# Patient Record
Sex: MALE | Race: WHITE | NOT HISPANIC OR LATINO | URBAN - METROPOLITAN AREA
[De-identification: names, ages, dates, MRNs, and addresses within clinical notes are randomized per-mention and may not be internally consistent; named-entity substitution may affect disease eponyms.]

---

## 2018-05-04 ENCOUNTER — HOSPITAL ENCOUNTER (INPATIENT)
Facility: HOSPITAL | Age: 58
LOS: 3 days | Discharge: HOME | End: 2018-05-07
Attending: INTERNAL MEDICINE | Admitting: INTERNAL MEDICINE

## 2018-05-04 PROBLEM — I21.11 STEMI INVOLVING RIGHT CORONARY ARTERY (HCC): Status: ACTIVE | Noted: 2018-05-04

## 2018-05-04 PROBLEM — E78.5 DIET-CONTROLLED HYPERLIPIDEMIA: Status: ACTIVE | Noted: 2018-05-04

## 2018-05-04 PROBLEM — E11.9 DIABETES MELLITUS (HCC): Status: ACTIVE | Noted: 2018-05-04

## 2018-05-04 LAB
ALBUMIN SERPL-MCNC: 3.8 G/DL (ref 3.2–4.8)
ALBUMIN/GLOB SERPL: 1.5 G/DL (ref 1.5–2.5)
ALP SERPL-CCNC: 50 U/L (ref 25–100)
ALT SERPL W P-5'-P-CCNC: 43 U/L (ref 7–40)
ANION GAP SERPL CALCULATED.3IONS-SCNC: 6 MMOL/L (ref 3–11)
AST SERPL-CCNC: 94 U/L (ref 0–33)
BASOPHILS # BLD AUTO: 0.02 10*3/MM3 (ref 0–0.2)
BASOPHILS NFR BLD AUTO: 0.2 % (ref 0–1)
BILIRUB SERPL-MCNC: 0.5 MG/DL (ref 0.3–1.2)
BUN BLD-MCNC: 11 MG/DL (ref 9–23)
BUN/CREAT SERPL: 13.8 (ref 7–25)
CALCIUM SPEC-SCNC: 8.5 MG/DL (ref 8.7–10.4)
CHLORIDE SERPL-SCNC: 107 MMOL/L (ref 99–109)
CO2 SERPL-SCNC: 24 MMOL/L (ref 20–31)
CREAT BLD-MCNC: 0.8 MG/DL (ref 0.6–1.3)
DEPRECATED RDW RBC AUTO: 37.9 FL (ref 37–54)
EOSINOPHIL # BLD AUTO: 0.08 10*3/MM3 (ref 0–0.3)
EOSINOPHIL NFR BLD AUTO: 0.9 % (ref 0–3)
ERYTHROCYTE [DISTWIDTH] IN BLOOD BY AUTOMATED COUNT: 12.3 % (ref 11.3–14.5)
GFR SERPL CREATININE-BSD FRML MDRD: 99 ML/MIN/1.73
GLOBULIN UR ELPH-MCNC: 2.5 GM/DL
GLUCOSE BLD-MCNC: 120 MG/DL (ref 70–100)
GLUCOSE BLDC GLUCOMTR-MCNC: 109 MG/DL (ref 70–130)
GLUCOSE BLDC GLUCOMTR-MCNC: 112 MG/DL (ref 70–130)
HCT VFR BLD AUTO: 40.9 % (ref 38.9–50.9)
HGB BLD-MCNC: 13.8 G/DL (ref 13.1–17.5)
IMM GRANULOCYTES # BLD: 0.02 10*3/MM3 (ref 0–0.03)
IMM GRANULOCYTES NFR BLD: 0.2 % (ref 0–0.6)
LYMPHOCYTES # BLD AUTO: 1.5 10*3/MM3 (ref 0.6–4.8)
LYMPHOCYTES NFR BLD AUTO: 17.6 % (ref 24–44)
MAGNESIUM SERPL-MCNC: 2 MG/DL (ref 1.3–2.7)
MCH RBC QN AUTO: 28.5 PG (ref 27–31)
MCHC RBC AUTO-ENTMCNC: 33.7 G/DL (ref 32–36)
MCV RBC AUTO: 84.5 FL (ref 80–99)
MONOCYTES # BLD AUTO: 0.77 10*3/MM3 (ref 0–1)
MONOCYTES NFR BLD AUTO: 9 % (ref 0–12)
NEUTROPHILS # BLD AUTO: 6.17 10*3/MM3 (ref 1.5–8.3)
NEUTROPHILS NFR BLD AUTO: 72.3 % (ref 41–71)
PLATELET # BLD AUTO: 201 10*3/MM3 (ref 150–450)
PMV BLD AUTO: 10.4 FL (ref 6–12)
POTASSIUM BLD-SCNC: 3.9 MMOL/L (ref 3.5–5.5)
PROT SERPL-MCNC: 6.3 G/DL (ref 5.7–8.2)
RBC # BLD AUTO: 4.84 10*6/MM3 (ref 4.2–5.76)
SODIUM BLD-SCNC: 137 MMOL/L (ref 132–146)
TROPONIN I SERPL-MCNC: 30.91 NG/ML
WBC NRBC COR # BLD: 8.54 10*3/MM3 (ref 3.5–10.8)

## 2018-05-04 PROCEDURE — S0260 H&P FOR SURGERY: HCPCS | Performed by: INTERNAL MEDICINE

## 2018-05-04 PROCEDURE — C1760 CLOSURE DEV, VASC: HCPCS | Performed by: INTERNAL MEDICINE

## 2018-05-04 PROCEDURE — 80053 COMPREHEN METABOLIC PANEL: CPT | Performed by: INTERNAL MEDICINE

## 2018-05-04 PROCEDURE — 25010000002 BIVALIRUDIN 5 MG/ML: Performed by: INTERNAL MEDICINE

## 2018-05-04 PROCEDURE — B2111ZZ FLUOROSCOPY OF MULTIPLE CORONARY ARTERIES USING LOW OSMOLAR CONTRAST: ICD-10-PCS | Performed by: INTERNAL MEDICINE

## 2018-05-04 PROCEDURE — C1874 STENT, COATED/COV W/DEL SYS: HCPCS | Performed by: INTERNAL MEDICINE

## 2018-05-04 PROCEDURE — C1769 GUIDE WIRE: HCPCS | Performed by: INTERNAL MEDICINE

## 2018-05-04 PROCEDURE — C1887 CATHETER, GUIDING: HCPCS | Performed by: INTERNAL MEDICINE

## 2018-05-04 PROCEDURE — 85025 COMPLETE CBC W/AUTO DIFF WBC: CPT | Performed by: INTERNAL MEDICINE

## 2018-05-04 PROCEDURE — 93458 L HRT ARTERY/VENTRICLE ANGIO: CPT | Performed by: INTERNAL MEDICINE

## 2018-05-04 PROCEDURE — C1725 CATH, TRANSLUMIN NON-LASER: HCPCS | Performed by: INTERNAL MEDICINE

## 2018-05-04 PROCEDURE — 99223 1ST HOSP IP/OBS HIGH 75: CPT | Performed by: INTERNAL MEDICINE

## 2018-05-04 PROCEDURE — 83735 ASSAY OF MAGNESIUM: CPT | Performed by: INTERNAL MEDICINE

## 2018-05-04 PROCEDURE — 93010 ELECTROCARDIOGRAM REPORT: CPT | Performed by: INTERNAL MEDICINE

## 2018-05-04 PROCEDURE — 25010000002 MIDAZOLAM PER 1 MG: Performed by: INTERNAL MEDICINE

## 2018-05-04 PROCEDURE — 25010000002 EPTIFIBATIDE 20 MG/10ML SOLUTION: Performed by: INTERNAL MEDICINE

## 2018-05-04 PROCEDURE — 93005 ELECTROCARDIOGRAM TRACING: CPT | Performed by: INTERNAL MEDICINE

## 2018-05-04 PROCEDURE — 027034Z DILATION OF CORONARY ARTERY, ONE ARTERY WITH DRUG-ELUTING INTRALUMINAL DEVICE, PERCUTANEOUS APPROACH: ICD-10-PCS | Performed by: INTERNAL MEDICINE

## 2018-05-04 PROCEDURE — C9606 PERC D-E COR REVASC W AMI S: HCPCS | Performed by: INTERNAL MEDICINE

## 2018-05-04 PROCEDURE — 3E033PZ INTRODUCTION OF PLATELET INHIBITOR INTO PERIPHERAL VEIN, PERCUTANEOUS APPROACH: ICD-10-PCS | Performed by: INTERNAL MEDICINE

## 2018-05-04 PROCEDURE — C1894 INTRO/SHEATH, NON-LASER: HCPCS | Performed by: INTERNAL MEDICINE

## 2018-05-04 PROCEDURE — 25010000002 FENTANYL CITRATE (PF) 100 MCG/2ML SOLUTION: Performed by: INTERNAL MEDICINE

## 2018-05-04 PROCEDURE — 84484 ASSAY OF TROPONIN QUANT: CPT | Performed by: INTERNAL MEDICINE

## 2018-05-04 PROCEDURE — 92941 PRQ TRLML REVSC TOT OCCL AMI: CPT | Performed by: INTERNAL MEDICINE

## 2018-05-04 PROCEDURE — 4A023N7 MEASUREMENT OF CARDIAC SAMPLING AND PRESSURE, LEFT HEART, PERCUTANEOUS APPROACH: ICD-10-PCS | Performed by: INTERNAL MEDICINE

## 2018-05-04 PROCEDURE — 82962 GLUCOSE BLOOD TEST: CPT

## 2018-05-04 PROCEDURE — B2151ZZ FLUOROSCOPY OF LEFT HEART USING LOW OSMOLAR CONTRAST: ICD-10-PCS | Performed by: INTERNAL MEDICINE

## 2018-05-04 PROCEDURE — 0 IOPAMIDOL PER 1 ML: Performed by: INTERNAL MEDICINE

## 2018-05-04 DEVICE — XIENCE ALPINE EVEROLIMUS ELUTING CORONARY STENT SYSTEM 3.50 MM X 38 MM / RAPID-EXCHANGE
Type: IMPLANTABLE DEVICE | Status: FUNCTIONAL
Brand: XIENCE ALPINE

## 2018-05-04 RX ORDER — LISINOPRIL 5 MG/1
5 TABLET ORAL DAILY
Status: DISCONTINUED | OUTPATIENT
Start: 2018-05-04 | End: 2018-05-04

## 2018-05-04 RX ORDER — SODIUM PHOSPHATE,MONO-DIBASIC 19G-7G/118
1200 ENEMA (ML) RECTAL 2 TIMES DAILY WITH MEALS
COMMUNITY

## 2018-05-04 RX ORDER — UBIDECARENONE 100 MG
100 CAPSULE ORAL DAILY
COMMUNITY

## 2018-05-04 RX ORDER — HYDROCODONE BITARTRATE AND ACETAMINOPHEN 5; 325 MG/1; MG/1
1 TABLET ORAL EVERY 4 HOURS PRN
Status: DISCONTINUED | OUTPATIENT
Start: 2018-05-04 | End: 2018-05-07 | Stop reason: HOSPADM

## 2018-05-04 RX ORDER — NICOTINE POLACRILEX 4 MG
15 LOZENGE BUCCAL
Status: DISCONTINUED | OUTPATIENT
Start: 2018-05-04 | End: 2018-05-04

## 2018-05-04 RX ORDER — FENTANYL CITRATE 50 UG/ML
INJECTION, SOLUTION INTRAMUSCULAR; INTRAVENOUS AS NEEDED
Status: DISCONTINUED | OUTPATIENT
Start: 2018-05-04 | End: 2018-05-04 | Stop reason: HOSPADM

## 2018-05-04 RX ORDER — MIDAZOLAM HYDROCHLORIDE 1 MG/ML
INJECTION INTRAMUSCULAR; INTRAVENOUS AS NEEDED
Status: DISCONTINUED | OUTPATIENT
Start: 2018-05-04 | End: 2018-05-04 | Stop reason: HOSPADM

## 2018-05-04 RX ORDER — HEPARIN SODIUM 5000 [USP'U]/ML
5000 INJECTION, SOLUTION INTRAVENOUS; SUBCUTANEOUS EVERY 12 HOURS SCHEDULED
Status: DISCONTINUED | OUTPATIENT
Start: 2018-05-05 | End: 2018-05-07 | Stop reason: HOSPADM

## 2018-05-04 RX ORDER — ASPIRIN 81 MG/1
81 TABLET, CHEWABLE ORAL DAILY
Status: DISCONTINUED | OUTPATIENT
Start: 2018-05-04 | End: 2018-05-07 | Stop reason: HOSPADM

## 2018-05-04 RX ORDER — DIPHENHYDRAMINE HYDROCHLORIDE 50 MG/ML
25 INJECTION INTRAMUSCULAR; INTRAVENOUS EVERY 6 HOURS PRN
Status: DISCONTINUED | OUTPATIENT
Start: 2018-05-04 | End: 2018-05-07 | Stop reason: HOSPADM

## 2018-05-04 RX ORDER — ACETAMINOPHEN 325 MG/1
650 TABLET ORAL EVERY 4 HOURS PRN
Status: DISCONTINUED | OUTPATIENT
Start: 2018-05-04 | End: 2018-05-04

## 2018-05-04 RX ORDER — ASPIRIN 325 MG
325 TABLET ORAL DAILY
Status: DISCONTINUED | OUTPATIENT
Start: 2018-05-05 | End: 2018-05-04

## 2018-05-04 RX ORDER — CLOPIDOGREL BISULFATE 75 MG/1
75 TABLET ORAL DAILY
Status: DISCONTINUED | OUTPATIENT
Start: 2018-05-05 | End: 2018-05-04

## 2018-05-04 RX ORDER — ATORVASTATIN CALCIUM 40 MG/1
80 TABLET, FILM COATED ORAL NIGHTLY
Status: DISCONTINUED | OUTPATIENT
Start: 2018-05-04 | End: 2018-05-07 | Stop reason: HOSPADM

## 2018-05-04 RX ORDER — EPTIFIBATIDE 2 MG/ML
INJECTION, SOLUTION INTRAVENOUS AS NEEDED
Status: DISCONTINUED | OUTPATIENT
Start: 2018-05-04 | End: 2018-05-04 | Stop reason: HOSPADM

## 2018-05-04 RX ORDER — OMEPRAZOLE 20 MG/1
20 CAPSULE, DELAYED RELEASE ORAL DAILY
COMMUNITY

## 2018-05-04 RX ORDER — SODIUM CHLORIDE 0.9 % (FLUSH) 0.9 %
1-10 SYRINGE (ML) INJECTION AS NEEDED
Status: DISCONTINUED | OUTPATIENT
Start: 2018-05-04 | End: 2018-05-07 | Stop reason: HOSPADM

## 2018-05-04 RX ORDER — AMPICILLIN TRIHYDRATE 250 MG
1200 CAPSULE ORAL 2 TIMES DAILY
COMMUNITY
End: 2018-05-07 | Stop reason: HOSPADM

## 2018-05-04 RX ORDER — CALCIUM CARBONATE 200(500)MG
2 TABLET,CHEWABLE ORAL 2 TIMES DAILY PRN
Status: DISCONTINUED | OUTPATIENT
Start: 2018-05-04 | End: 2018-05-07 | Stop reason: HOSPADM

## 2018-05-04 RX ORDER — TADALAFIL 5 MG/1
5 TABLET ORAL DAILY PRN
COMMUNITY
End: 2018-05-07 | Stop reason: HOSPADM

## 2018-05-04 RX ORDER — NEBIVOLOL 5 MG/1
5 TABLET ORAL
Status: DISCONTINUED | OUTPATIENT
Start: 2018-05-04 | End: 2018-05-07 | Stop reason: HOSPADM

## 2018-05-04 RX ORDER — SODIUM CHLORIDE 9 MG/ML
INJECTION, SOLUTION INTRAVENOUS CONTINUOUS PRN
Status: DISCONTINUED | OUTPATIENT
Start: 2018-05-04 | End: 2018-05-04 | Stop reason: HOSPADM

## 2018-05-04 RX ORDER — DEXTROSE MONOHYDRATE 25 G/50ML
25 INJECTION, SOLUTION INTRAVENOUS
Status: DISCONTINUED | OUTPATIENT
Start: 2018-05-04 | End: 2018-05-04

## 2018-05-04 RX ORDER — ATROPINE SULFATE 1 MG/ML
INJECTION, SOLUTION INTRAMUSCULAR; INTRAVENOUS; SUBCUTANEOUS AS NEEDED
Status: DISCONTINUED | OUTPATIENT
Start: 2018-05-04 | End: 2018-05-04 | Stop reason: HOSPADM

## 2018-05-04 RX ORDER — DOCUSATE SODIUM 100 MG/1
100 CAPSULE, LIQUID FILLED ORAL DAILY
Status: DISCONTINUED | OUTPATIENT
Start: 2018-05-04 | End: 2018-05-07 | Stop reason: HOSPADM

## 2018-05-04 RX ORDER — LIDOCAINE HYDROCHLORIDE 10 MG/ML
INJECTION, SOLUTION EPIDURAL; INFILTRATION; INTRACAUDAL; PERINEURAL AS NEEDED
Status: DISCONTINUED | OUTPATIENT
Start: 2018-05-04 | End: 2018-05-04 | Stop reason: HOSPADM

## 2018-05-04 RX ORDER — BISACODYL 5 MG/1
5 TABLET, DELAYED RELEASE ORAL DAILY PRN
Status: DISCONTINUED | OUTPATIENT
Start: 2018-05-04 | End: 2018-05-07 | Stop reason: HOSPADM

## 2018-05-04 RX ORDER — NICARDIPINE HYDROCHLORIDE 2.5 MG/ML
INJECTION INTRAVENOUS AS NEEDED
Status: DISCONTINUED | OUTPATIENT
Start: 2018-05-04 | End: 2018-05-04 | Stop reason: HOSPADM

## 2018-05-04 RX ORDER — ASPIRIN 81 MG/1
81 TABLET, CHEWABLE ORAL DAILY
COMMUNITY

## 2018-05-04 RX ADMIN — ASPIRIN 81 MG 81 MG: 81 TABLET ORAL at 17:12

## 2018-05-04 RX ADMIN — ATORVASTATIN CALCIUM 80 MG: 40 TABLET, FILM COATED ORAL at 20:17

## 2018-05-04 RX ADMIN — NEBIVOLOL HYDROCHLORIDE 5 MG: 5 TABLET ORAL at 17:12

## 2018-05-04 NOTE — PROGRESS NOTES
INTENSIVIST   PROGRESS NOTE     Hospital:  LOS: 0 days   Subjective   S     Mr. Nicholas Ventura, 58 y.o. male is followed for:      STEMI involving right coronary artery    Diabetes mellitus    As an Intensivist, we provide an integrated approach to the ICU patient and family, medical management of comorbid conditions, lead interdisciplinary rounds and coordinate the care with all other services, including those from other specialists.     Interval History:    Day of Surgery [facility]     Nicholas Ventura is a 58 y.o. male presented to [facility] today for chest pressure/burning from an OSH ([facility]).      He has been having this burning sensation intermittently for the last  couple months.  He saw his PCP (SALVATORE) last week and had an EKG and 24 hour heart monitor.  He was to follow-up on Monday.      He is a  and started having this pressure and went to an OSH for treatment.  He was found to have an inferior STEMI and transferred to [facility] for treament.    He was taken emergently to the Cath Lab upon arrival and was found to have an occluded RCA.  He received a DINA stent to the RCA.  His LVEF was 45-50%.     He has arrived to the ICU from the cath lab.  He has no chest pain.  He has a right groin puncture site that is oozing.  He is hemodynamically stable.     The patient's relevant past medical, surgical and social history were reviewed and updated in Epic as appropriate.     PMH: He  has a past medical history of Diabetes mellitus and Hyperlipidemia.   PSxH: He  has no past surgical history on file.      Medications:  No current facility-administered medications on file prior to encounter.      No current outpatient prescriptions on file prior to encounter.       Allergies: He has No Known Allergies.   FH: His family history includes Cancer in his father and mother; Transient ischemic attack in his father.   SH: He  reports that he has never smoked. He has never used smokeless tobacco. He reports that he does  not drink alcohol or use drugs.     ROS:   Constitutional: Negative for fever.   Respiratory: Negative for dyspnea.   Cardiovascular: Negative for chest pain.   Gastrointestinal: Negative for  nausea, vomiting and diarrhea.   All other systems reviewed and are negative.    Objective   O     Vitals:  Temp: 98 °F (36.7 °C) (05/04/18 1700) Temp  Min: 98 °F (36.7 °C)  Max: 98 °F (36.7 °C)   BP: 128/88 (05/04/18 1700) BP  Min: 117/76  Max: 153/82   Pulse: 95 (05/04/18 1700) Pulse  Min: 74  Max: 99   Resp: 16 (05/04/18 1639) Resp  Min: 16  Max: 16   SpO2: 98 % (05/04/18 1700) SpO2  Min: 97 %  Max: 98 %   Device: room air (05/04/18 1700)    Flow Rate:   No Data Recorded     Physical Examination  Telemetry:  Rhythm: normal sinus rhythm (05/04/18 1700)         Constitutional:  No acute distress.   Cardiovascular: Normal rate, regular rhythm. Normal heart sounds.  No murmurs, gallop or rub.   Respiratory: No respiratory distress. Normal respiratory effort.  Normal breath sounds  Clear to auscultation and percussion.    Abdominal:  Soft. No masses. Non-tender. No distension. No HSM.   Extremities: No digital cyanosis. No clubbing.  No peripheral edema.   Neurological:   Alert and Oriented to person, place, and time.     Lines/Drains/Airways: Peripheral IV(s)     Hematology:    Results from last 7 days  Lab Units 05/04/18  1706   WBC 10*3/mm3 8.54   HEMOGLOBIN g/dL 13.8   MCV fL 84.5   PLATELETS 10*3/mm3 201     Chemistry:    Results from last 7 days  Lab Units 05/04/18  1706   SODIUM mmol/L 137   POTASSIUM mmol/L 3.9   CHLORIDE mmol/L 107   CO2 mmol/L 24.0   ANION GAP mmol/L 6.0   GLUCOSE mg/dL 120*   CALCIUM mg/dL 8.5*       Results from last 7 days  Lab Units 05/04/18  1706   BUN mg/dL 11   CREATININE mg/dL 0.80       Results from last 7 days  Lab Units 05/04/18  1706   MAGNESIUM mg/dL 2.0     Estimated Creatinine Clearance: 113.5 mL/min (by C-G formula based on SCr of 0.8 mg/dL).    Hepatic:    Results from last 7 days  Lab  Units 05/04/18  1706   ALK PHOS U/L 50   BILIRUBIN mg/dL 0.5   ALT (SGPT) U/L 43*   AST (SGOT) U/L 94*   ALBUMIN g/dL 3.80     Images:  No radiology results for the last day    Results: Reviewed.  I reviewed the patient's new laboratory and imaging results.  I independently reviewed the patient's new images.    Medications: Reviewed.    Assessment/Plan   A / P     58 y.o.male, admitted on 5/4/2018 with STEMI involving right coronary artery [I21.11]:     1. Inferior STEMI  1. LHC (5/4/2018) Xience (Everolimus) EES to RCA  2. EF 45-50%  3. Inferior wall, severe HK  2. Dyslipidemia  3. Antibiotics: none  4. Overweight. Body mass index is 29.18 kg/m².   5. T2DM (diet controlled)    Results from last 7 days  Lab Units 05/04/18  1721   GLUCOSE mg/dL 109     No results found for: HGBA1C    Nutrition Support: Patient isn't on Tube Feeding   Modulars: Patient doesn't have any tube feeding modular orders   Diet: Diet Regular; Consistent Carbohydrate, Cardiac No active supplement orders     Advance Directives: Full Code       Assessment / Plan:    1. ICU admission post C  2. Glucose < 180  3. HbA1c in AM  Low cholesterol <200 mg.dl and low saturated fat <7% diet  LDL goal < 70 mg/dl  BP < 130/80 if DM or CKD  HbA1c controlled  Exercise at least 30 minutes 3-4 times per week  BMI 18.5-24.9  Annual influenza vaccine  4. ASA 81 mg/day  5. P2Y12 inhibitor for 12 months  6. b-Blocker  7. High dose statins  8. ACEI or ARB  9. Nitrates prn symptoms    Plan of care and goals reviewed during interdisciplinary rounds.  Level of Risk is High due to:  illness with threat to life or bodily function.   I discussed the patient's findings and my recommendations with patient    Adams John MD, FACP, FCCP, CNSC  Intensive Care Medicine, Nutrition Support and Pulmonary Medicine

## 2018-05-04 NOTE — H&P
[facility] at [facility]   History and physical    Patient Care Team:  No Known Provider as PCP - General     Problem List:  1. STEMI  2. Diet controlled diabetes  3. GERD  4. Surgeries:  1. None       Allergies no known allergies        Current Facility-Administered Medications:   •  bivalirudin (ANGIOMAX) bolus from bag, , , PRN, Louis Olivia MD, 67.05 mg at 05/04/18 1602  •  fentaNYL citrate (PF) (SUBLIMAZE) injection, , , PRN, Louis Olivia MD, 50 mcg at 05/04/18 1558  •  lidocaine PF 1% (XYLOCAINE) injection, , , PRN, Louis Olivia MD, 10 mL at 05/04/18 1559  •  midazolam (VERSED) injection, , , PRN, Louis Olivia MD, 2 mg at 05/04/18 1558         No prescriptions prior to admission.     Home medications  Prilosec  Cialis PRN    Subjective .   History of present illness:    Patient presents today for emergent cardiac catheterization due to inferior STEMI. No previous history of CAD. Has had some recurrent chest pain over the last couple of weeks. Today he had sudden onset of severe left sided chest burning sensation that did not go away. After this persisted for a few hours he presented to the closest ER for further evaluation. There he was diagnosed with an inferior STEMI and transferred for emergent cath. On arrival complains of a burning sensation in his chest with still persistent ST elevation.    Patient is a  who resides in [place].    Social History     Social History   • Marital status: Unknown     Spouse name: N/A   • Number of children: N/A   • Years of education: N/A     Occupational History   • Not on file.     Social History Main Topics   • Smoking status: Never Smoker   • Smokeless tobacco: Not on file   • Alcohol use No   • Drug use: No   • Sexual activity: Not on file     Other Topics Concern   • Not on file     Social History Narrative   • No narrative on file     Family History   Problem Relation Age of Onset   • Transient ischemic attack Father   "        Review of Systems:  Review of Systems   Constitution: Negative for fever, weakness and malaise/fatigue.   HENT: Negative for hoarse voice, nosebleeds and tinnitus.    Eyes: Negative for pain and visual disturbance.   Cardiovascular: Positive for chest pain. Negative for claudication, cyanosis, dyspnea on exertion, irregular heartbeat, leg swelling, near-syncope, orthopnea, palpitations, paroxysmal nocturnal dyspnea and syncope.   Respiratory: Negative for cough, hemoptysis, shortness of breath, sleep disturbances due to breathing, snoring, sputum production and wheezing.    Endocrine: Negative for polydipsia and polyuria.   Hematologic/Lymphatic: Negative for bleeding problem. Does not bruise/bleed easily.   Skin: Negative for itching, rash and skin cancer.   Musculoskeletal: Negative for arthritis, back pain, joint pain, joint swelling and myalgias.   Gastrointestinal: Positive for heartburn. Negative for abdominal pain, anorexia, constipation, diarrhea, hematemesis, hematochezia, melena, nausea and vomiting.   Genitourinary: Negative for bladder incontinence, dysuria and hematuria.   Neurological: Negative for disturbances in coordination, dizziness, focal weakness, headaches, light-headedness, loss of balance, numbness, seizures and vertigo.   Psychiatric/Behavioral: Negative for altered mental status, depression and memory loss. The patient is not nervous/anxious.    Allergic/Immunologic: Negative for hives and persistent infections.        Objective   Vitals:  Blood pressure 153/82, pulse 74, resp. rate 16, height 177.8 cm (70\"), weight 89.4 kg (197 lb), SpO2 98 %.     Physical Exam   Constitutional: He is oriented to person, place, and time. He appears well-developed and well-nourished. No distress.   HENT:   Head: Normocephalic and atraumatic.   Mouth/Throat: Oropharynx is clear and moist.   Eyes: Right eye exhibits no discharge. Left eye exhibits no discharge.   Neck: No JVD present. Carotid bruit is " not present.   No bruit auscultated bilaterally   Cardiovascular: Normal rate, regular rhythm, S1 normal, S2 normal, normal heart sounds and intact distal pulses.  Exam reveals no gallop, no S3 and no S4.    No murmur heard.  Pulses:       Carotid pulses are 2+ on the right side, and 2+ on the left side.       Radial pulses are 2+ on the right side, and 2+ on the left side.   Pulmonary/Chest: Effort normal and breath sounds normal. No respiratory distress.   Abdominal: Soft. Bowel sounds are normal. There is no tenderness.   Musculoskeletal: He exhibits no edema.   Neurological: He is alert and oriented to person, place, and time.   Skin: Skin is warm and dry. No rash noted.            Results Review:  No labs sent from outside facility with patient. We will check them here.      Tele:  SR    EKG: SR with inferior elevation and septal depression      Assessment/Plan     1. Inferior STEMI  2. Diabetes       Plan:    1. Emergent left heart cath with possible intervention. Further recommendations to follow cath. Upon discharge will need to schedule follow-up with a cardiologist in [place] where he is from.    Louis Olivia MD  05/04/18  4:04 PM    Dictated utilizing Dragon dictation

## 2018-05-04 NOTE — PLAN OF CARE
Problem: Patient Care Overview  Goal: Plan of Care Review  Outcome: Ongoing (interventions implemented as appropriate)   05/04/18 3552   Coping/Psychosocial   Plan of Care Reviewed With patient   Plan of Care Review   Progress improving   OTHER   Outcome Summary Admitted to 2A VSS, additional manual pressure needed for 30min at s/p sheath site. Hemostsis acheived.        Problem: Cardiac: ACS (Acute Coronary Syndrome) (Adult)  Goal: Signs and Symptoms of Listed Potential Problems Will be Absent, Minimized or Managed (Cardiac: ACS)  Outcome: Ongoing (interventions implemented as appropriate)

## 2018-05-05 ENCOUNTER — APPOINTMENT (OUTPATIENT)
Dept: CARDIOLOGY | Facility: HOSPITAL | Age: 58
End: 2018-05-05
Attending: INTERNAL MEDICINE

## 2018-05-05 LAB
ANION GAP SERPL CALCULATED.3IONS-SCNC: 4 MMOL/L (ref 3–11)
ARTICHOKE IGE QN: 115 MG/DL (ref 0–130)
BH CV ECHO MEAS - AO ROOT AREA (BSA CORRECTED): 1.5
BH CV ECHO MEAS - AO ROOT AREA: 7.9 CM^2
BH CV ECHO MEAS - AO ROOT DIAM: 3.2 CM
BH CV ECHO MEAS - BSA(HAYCOCK): 2.2 M^2
BH CV ECHO MEAS - BSA: 2.1 M^2
BH CV ECHO MEAS - BZI_BMI: 29.1 KILOGRAMS/M^2
BH CV ECHO MEAS - BZI_METRIC_HEIGHT: 177.8 CM
BH CV ECHO MEAS - BZI_METRIC_WEIGHT: 92.1 KG
BH CV ECHO MEAS - CONTRAST EF (2CH): 64.4 ML/M^2
BH CV ECHO MEAS - CONTRAST EF 4CH: 43.3 ML/M^2
BH CV ECHO MEAS - EDV(CUBED): 99.3 ML
BH CV ECHO MEAS - EDV(MOD-SP2): 45 ML
BH CV ECHO MEAS - EDV(MOD-SP4): 67 ML
BH CV ECHO MEAS - EDV(TEICH): 98.9 ML
BH CV ECHO MEAS - EF(CUBED): 75.8 %
BH CV ECHO MEAS - EF(MOD-BP): 52 %
BH CV ECHO MEAS - EF(MOD-SP2): 64.4 %
BH CV ECHO MEAS - EF(MOD-SP4): 43.3 %
BH CV ECHO MEAS - EF(TEICH): 67.8 %
BH CV ECHO MEAS - ESV(CUBED): 24 ML
BH CV ECHO MEAS - ESV(MOD-SP2): 16 ML
BH CV ECHO MEAS - ESV(MOD-SP4): 38 ML
BH CV ECHO MEAS - ESV(TEICH): 31.8 ML
BH CV ECHO MEAS - FS: 37.7 %
BH CV ECHO MEAS - IVS/LVPW: 0.91
BH CV ECHO MEAS - IVSD: 1 CM
BH CV ECHO MEAS - LA DIMENSION: 3.8 CM
BH CV ECHO MEAS - LA/AO: 1.2
BH CV ECHO MEAS - LV DIASTOLIC VOL/BSA (35-75): 31.9 ML/M^2
BH CV ECHO MEAS - LV MASS(C)D: 181.3 GRAMS
BH CV ECHO MEAS - LV MASS(C)DI: 86.3 GRAMS/M^2
BH CV ECHO MEAS - LV SYSTOLIC VOL/BSA (12-30): 18.1 ML/M^2
BH CV ECHO MEAS - LVIDD: 4.6 CM
BH CV ECHO MEAS - LVIDS: 2.9 CM
BH CV ECHO MEAS - LVLD AP2: 7.1 CM
BH CV ECHO MEAS - LVLD AP4: 7.6 CM
BH CV ECHO MEAS - LVLS AP2: 5.5 CM
BH CV ECHO MEAS - LVLS AP4: 6.5 CM
BH CV ECHO MEAS - LVPWD: 1.1 CM
BH CV ECHO MEAS - MED PEAK E' VEL: 7.3 CM/SEC
BH CV ECHO MEAS - MV A DUR: 6.6 SEC
BH CV ECHO MEAS - MV A MAX VEL: 46.9 CM/SEC
BH CV ECHO MEAS - MV E MAX VEL: 57.8 CM/SEC
BH CV ECHO MEAS - MV E/A: 1.2
BH CV ECHO MEAS - PA ACC SLOPE: 249.2 CM/SEC^2
BH CV ECHO MEAS - PA ACC TIME: 0.16 SEC
BH CV ECHO MEAS - PA PR(ACCEL): 7.7 MMHG
BH CV ECHO MEAS - SI(CUBED): 35.9 ML/M^2
BH CV ECHO MEAS - SI(MOD-SP2): 13.8 ML/M^2
BH CV ECHO MEAS - SI(MOD-SP4): 13.8 ML/M^2
BH CV ECHO MEAS - SI(TEICH): 31.9 ML/M^2
BH CV ECHO MEAS - SV(CUBED): 75.3 ML
BH CV ECHO MEAS - SV(MOD-SP2): 29 ML
BH CV ECHO MEAS - SV(MOD-SP4): 29 ML
BH CV ECHO MEAS - SV(TEICH): 67.1 ML
BH CV ECHO MEAS - TAPSE (>1.6): 1.4 CM2
BH CV ECHO MEAS - TR MAX VEL: 167 CM/SEC
BH CV VAS BP RIGHT ARM: NORMAL MMHG
BH CV XLRA - RV BASE: 4 CM
BH CV XLRA - RV LENGTH: 8.7 CM
BH CV XLRA - RV MID: 3.2 CM
BH CV XLRA - TDI S': 3.2 CM/SEC
BUN BLD-MCNC: 11 MG/DL (ref 9–23)
BUN/CREAT SERPL: 12.2 (ref 7–25)
CALCIUM SPEC-SCNC: 9.2 MG/DL (ref 8.7–10.4)
CHLORIDE SERPL-SCNC: 105 MMOL/L (ref 99–109)
CHOLEST SERPL-MCNC: 163 MG/DL (ref 0–200)
CO2 SERPL-SCNC: 29 MMOL/L (ref 20–31)
CREAT BLD-MCNC: 0.9 MG/DL (ref 0.6–1.3)
DEPRECATED RDW RBC AUTO: 38.9 FL (ref 37–54)
ERYTHROCYTE [DISTWIDTH] IN BLOOD BY AUTOMATED COUNT: 12.6 % (ref 11.3–14.5)
GFR SERPL CREATININE-BSD FRML MDRD: 86 ML/MIN/1.73
GLUCOSE BLD-MCNC: 173 MG/DL (ref 70–100)
GLUCOSE BLDC GLUCOMTR-MCNC: 130 MG/DL (ref 70–130)
GLUCOSE BLDC GLUCOMTR-MCNC: 130 MG/DL (ref 70–130)
GLUCOSE BLDC GLUCOMTR-MCNC: 136 MG/DL (ref 70–130)
GLUCOSE BLDC GLUCOMTR-MCNC: 159 MG/DL (ref 70–130)
HBA1C MFR BLD: 6.9 % (ref 4.8–5.6)
HCT VFR BLD AUTO: 42.7 % (ref 38.9–50.9)
HDLC SERPL-MCNC: 38 MG/DL (ref 40–60)
HGB BLD-MCNC: 14.5 G/DL (ref 13.1–17.5)
LV EF 2D ECHO EST: 50 %
MAXIMAL PREDICTED HEART RATE: 161 BPM
MCH RBC QN AUTO: 28.9 PG (ref 27–31)
MCHC RBC AUTO-ENTMCNC: 34 G/DL (ref 32–36)
MCV RBC AUTO: 85.2 FL (ref 80–99)
PLATELET # BLD AUTO: 222 10*3/MM3 (ref 150–450)
PMV BLD AUTO: 10.5 FL (ref 6–12)
POTASSIUM BLD-SCNC: 3.9 MMOL/L (ref 3.5–5.5)
RBC # BLD AUTO: 5.01 10*6/MM3 (ref 4.2–5.76)
SODIUM BLD-SCNC: 138 MMOL/L (ref 132–146)
STRESS TARGET HR: 137 BPM
TRIGL SERPL-MCNC: 154 MG/DL (ref 0–150)
TROPONIN I SERPL-MCNC: 124.84 NG/ML
TSH SERPL DL<=0.05 MIU/L-ACNC: 2.35 MIU/ML (ref 0.35–5.35)
WBC NRBC COR # BLD: 9.93 10*3/MM3 (ref 3.5–10.8)

## 2018-05-05 PROCEDURE — 93306 TTE W/DOPPLER COMPLETE: CPT

## 2018-05-05 PROCEDURE — 99232 SBSQ HOSP IP/OBS MODERATE 35: CPT | Performed by: INTERNAL MEDICINE

## 2018-05-05 PROCEDURE — 85027 COMPLETE CBC AUTOMATED: CPT | Performed by: INTERNAL MEDICINE

## 2018-05-05 PROCEDURE — 25010000002 HEPARIN (PORCINE) PER 1000 UNITS: Performed by: INTERNAL MEDICINE

## 2018-05-05 PROCEDURE — 83036 HEMOGLOBIN GLYCOSYLATED A1C: CPT | Performed by: INTERNAL MEDICINE

## 2018-05-05 PROCEDURE — 80048 BASIC METABOLIC PNL TOTAL CA: CPT | Performed by: INTERNAL MEDICINE

## 2018-05-05 PROCEDURE — 63710000001 INSULIN LISPRO (HUMAN) PER 5 UNITS: Performed by: INTERNAL MEDICINE

## 2018-05-05 PROCEDURE — 99233 SBSQ HOSP IP/OBS HIGH 50: CPT | Performed by: INTERNAL MEDICINE

## 2018-05-05 PROCEDURE — 84484 ASSAY OF TROPONIN QUANT: CPT | Performed by: INTERNAL MEDICINE

## 2018-05-05 PROCEDURE — 80061 LIPID PANEL: CPT | Performed by: INTERNAL MEDICINE

## 2018-05-05 PROCEDURE — 93306 TTE W/DOPPLER COMPLETE: CPT | Performed by: INTERNAL MEDICINE

## 2018-05-05 PROCEDURE — 82962 GLUCOSE BLOOD TEST: CPT

## 2018-05-05 PROCEDURE — 84443 ASSAY THYROID STIM HORMONE: CPT | Performed by: INTERNAL MEDICINE

## 2018-05-05 RX ADMIN — HEPARIN SODIUM 5000 UNITS: 5000 INJECTION, SOLUTION INTRAVENOUS; SUBCUTANEOUS at 08:31

## 2018-05-05 RX ADMIN — TICAGRELOR 90 MG: 90 TABLET ORAL at 08:31

## 2018-05-05 RX ADMIN — HEPARIN SODIUM 5000 UNITS: 5000 INJECTION, SOLUTION INTRAVENOUS; SUBCUTANEOUS at 21:11

## 2018-05-05 RX ADMIN — TICAGRELOR 90 MG: 90 TABLET ORAL at 21:11

## 2018-05-05 RX ADMIN — ATORVASTATIN CALCIUM 80 MG: 40 TABLET, FILM COATED ORAL at 21:11

## 2018-05-05 RX ADMIN — ASPIRIN 81 MG 81 MG: 81 TABLET ORAL at 08:31

## 2018-05-05 RX ADMIN — NEBIVOLOL HYDROCHLORIDE 5 MG: 5 TABLET ORAL at 08:31

## 2018-05-05 NOTE — PROGRESS NOTES
Intensive Care Follow-up      LOS: 1 day     Mr. Nicholas Ventura, 58 y.o. male is followed for: STEMI involving right coronary artery     Subjective - Interval History     Patient is awake and alert and has no further chest pain.  He did complain of some right groin pain earlier.    The patient's relevant past medical, surgical and social history were reviewed and updated in Epic as appropriate.     Objective     Infusions:     Medications:    aspirin 81 mg Oral Daily   atorvastatin 80 mg Oral Nightly   docusate sodium 100 mg Oral Daily   heparin (porcine) 5,000 Units Subcutaneous Q12H   insulin lispro 0-7 Units Subcutaneous 4x Daily With Meals & Nightly   nebivolol 5 mg Oral Q24H   pharmacy consult - MTM  Does not apply Daily   ticagrelor 90 mg Oral BID       Vital Sign Min/Max for last 24 hours  Temp  Min: 98 °F (36.7 °C)  Max: 98.6 °F (37 °C)   BP  Min: 101/88  Max: 153/82   Pulse  Min: 64  Max: 99   Resp  Min: 14  Max: 18   SpO2  Min: 94 %  Max: 98 %   No Data Recorded      Intake/Output Summary (Last 24 hours) at 05/05/18 1446  Last data filed at 05/05/18 1400   Gross per 24 hour   Intake              720 ml   Output             2855 ml   Net            -2135 ml           Physical Exam:   GENERAL: Awake and alert   HEENT: Normocephalic   LUNGS: Clear   HEART: Normal S1 and S2   ABDOMEN: Soft   EXTREMITIES: No edema   NEURO/PSYCH: No focal deficits      Results from last 7 days  Lab Units 05/05/18  0422 05/04/18  1706   WBC 10*3/mm3 9.93 8.54   HEMOGLOBIN g/dL 14.5 13.8   PLATELETS 10*3/mm3 222 201       Results from last 7 days  Lab Units 05/05/18  0422 05/04/18  1706   SODIUM mmol/L 138 137   POTASSIUM mmol/L 3.9 3.9   CO2 mmol/L 29.0 24.0   BUN mg/dL 11 11   CREATININE mg/dL 0.90 0.80   MAGNESIUM mg/dL  --  2.0   GLUCOSE mg/dL 173* 120*     Estimated Creatinine Clearance: 100.9 mL/min (by C-G formula based on SCr of 0.9 mg/dL).      Results from last 7 days  Lab Units 05/05/18  0422   HEMOGLOBIN A1C % 6.90*            No results found for: LACTATE       Images: No new imaging    I reviewed the patient's results and images.     Impression      Hospital Problem List     * (Principal)STEMI involving right coronary artery          Diabetes mellitus          Diet-controlled hyperlipidemia                     Plan        Patient seems be doing well following his MI.  He's preceding per protocol.  He is awaiting transfer to the floor.  We'll continue supportive measures.     Plan of care and goals reviewed with nurse at daily rounds.   I discussed the patient's findings and my recommendations with patient, family and nursing staff       Aldo Escobar MD  Pulmonary and Critical Care Medicine  05/05/18 2:46 PM

## 2018-05-05 NOTE — PLAN OF CARE
Problem: Patient Care Overview  Goal: Plan of Care Review  Outcome: Ongoing (interventions implemented as appropriate)   05/05/18 0606   Coping/Psychosocial   Plan of Care Reviewed With patient   Plan of Care Review   Progress improving   OTHER   Outcome Summary VSS, no further bleeding or hematoma noted, minimal pain, adequate UOP.       Problem: Cardiac: ACS (Acute Coronary Syndrome) (Adult)  Goal: Signs and Symptoms of Listed Potential Problems Will be Absent, Minimized or Managed (Cardiac: ACS)  Outcome: Ongoing (interventions implemented as appropriate)   05/05/18 0606   Goal/Outcome Evaluation   Problems Assessed (Acute Coronary Syndrome) all   Problems Present (Acute Coronary Syn) situational response       Problem: Pain, Acute (Adult)  Goal: Identify Related Risk Factors and Signs and Symptoms  Outcome: Ongoing (interventions implemented as appropriate)    Goal: Acceptable Pain Control/Comfort Level  Outcome: Ongoing (interventions implemented as appropriate)   05/05/18 0606   Pain, Acute (Adult)   Acceptable Pain Control/Comfort Level making progress toward outcome       Problem: Diabetes, Type 2 (Adult)  Goal: Signs and Symptoms of Listed Potential Problems Will be Absent, Minimized or Managed (Diabetes, Type 2)  Outcome: Ongoing (interventions implemented as appropriate)   05/05/18 0606   Goal/Outcome Evaluation   Problems Assessed (Type 2 Diabetes) all   Problems Present (Type 2 Diabetes) situational response

## 2018-05-05 NOTE — PROGRESS NOTES
"[facility]  IP Progress Note      Chief Complaint: Follow-up of acute MI/CAD.    Subjective:  Feels a lot better than how he did yesterday.  Still has very mild lower sternal pressure.  Coronary bed to chair without problems.  No palpitations.  No nausea or vomiting.    Objective:  Blood pressure 110/74, pulse 68, temperature 98.5 °F (36.9 °C), temperature source Oral, resp. rate 18, height 177.8 cm (70\"), weight 92.3 kg (203 lb 6.4 oz), SpO2 95 %.     Intake/Output Summary (Last 24 hours) at 05/05/18 0801  Last data filed at 05/05/18 0700   Gross per 24 hour   Intake              240 ml   Output             2155 ml   Net            -1915 ml       Physical Exam:  General: No acute distress.   Neck: no JVD.  Chest:No respiratory distress, breath sounds are normal. No wheezes,  rhonchi or rales.  Cardiovascular: Normal S1 and S2, no murmer, gallop or rub.    Extremities: No edema.Rt groin intact, no bleeding or hematoma.    Results Review:     I reviewed the patient's new clinical results.      Results from last 7 days  Lab Units 05/05/18  0422   WBC 10*3/mm3 9.93   HEMOGLOBIN g/dL 14.5   HEMATOCRIT % 42.7   PLATELETS 10*3/mm3 222       Results from last 7 days  Lab Units 05/05/18  0422 05/04/18  1706   SODIUM mmol/L 138 137   POTASSIUM mmol/L 3.9 3.9   CHLORIDE mmol/L 105 107   CO2 mmol/L 29.0 24.0   BUN mg/dL 11 11   CREATININE mg/dL 0.90 0.80   CALCIUM mg/dL 9.2 8.5*   BILIRUBIN mg/dL  --  0.5   ALK PHOS U/L  --  50   ALT (SGPT) U/L  --  43*   AST (SGOT) U/L  --  94*   GLUCOSE mg/dL 173* 120*       Results from last 7 days  Lab Units 05/05/18  0422   SODIUM mmol/L 138   POTASSIUM mmol/L 3.9   CHLORIDE mmol/L 105   CO2 mmol/L 29.0   BUN mg/dL 11   CREATININE mg/dL 0.90   GLUCOSE mg/dL 173*   CALCIUM mg/dL 9.2         Lab Results   Component Value Date    TROPONINI 124.844 (C) 05/05/2018       Results from last 7 days  Lab Units 05/05/18  0422   TSH mIU/mL 2.353       Results " from last 7 days  Lab Units 05/05/18  0422   CHOLESTEROL mg/dL 163   TRIGLYCERIDES mg/dL 154*   HDL CHOL mg/dL 38*   LDL CHOL mg/dL 115           Tele: Sinus Rythym      Assessment:   1. Acute Inferior STEMI  2. CAD, total occlusion of RCA, 30% plaque of the LAD, EF 50% with inferior hypokinesis.  3. 3.5x38 DINA to mid RCA.    4. Type 2 diabetes.    5. Dyslipidemia.   Plan:  1. Stable post MI/post procedure course.  2. Transfer to telemetry.  3. Increase activities, continue current medications, watch over the weekend, hopefully home Monday.    Elliott Rolle MD, FACC, [facility]

## 2018-05-05 NOTE — PLAN OF CARE
Problem: Patient Care Overview  Goal: Plan of Care Review  Outcome: Ongoing (interventions implemented as appropriate)   05/05/18 9884   Coping/Psychosocial   Plan of Care Reviewed With patient;family   Plan of Care Review   Progress improving   OTHER   Outcome Summary VSS, no hematoma or bleeding noted from s/p sheath site, CP resolved this morning, had groin pain that resolved with ice pack this afternoon. Ordered to Tele, probably home on Monday.      Goal: Individualization and Mutuality  Outcome: Ongoing (interventions implemented as appropriate)    Goal: Discharge Needs Assessment  Outcome: Ongoing (interventions implemented as appropriate)    Goal: Interprofessional Rounds/Family Conf  Outcome: Ongoing (interventions implemented as appropriate)      Problem: Cardiac: ACS (Acute Coronary Syndrome) (Adult)  Goal: Signs and Symptoms of Listed Potential Problems Will be Absent, Minimized or Managed (Cardiac: ACS)  Outcome: Ongoing (interventions implemented as appropriate)      Problem: Pain, Acute (Adult)  Goal: Identify Related Risk Factors and Signs and Symptoms  Outcome: Ongoing (interventions implemented as appropriate)    Goal: Acceptable Pain Control/Comfort Level  Outcome: Ongoing (interventions implemented as appropriate)      Problem: Diabetes, Type 2 (Adult)  Goal: Signs and Symptoms of Listed Potential Problems Will be Absent, Minimized or Managed (Diabetes, Type 2)  Outcome: Ongoing (interventions implemented as appropriate)

## 2018-05-06 PROBLEM — K21.9 GERD (GASTROESOPHAGEAL REFLUX DISEASE): Chronic | Status: ACTIVE | Noted: 2018-05-06

## 2018-05-06 LAB
GLUCOSE BLDC GLUCOMTR-MCNC: 120 MG/DL (ref 70–130)
GLUCOSE BLDC GLUCOMTR-MCNC: 123 MG/DL (ref 70–130)
GLUCOSE BLDC GLUCOMTR-MCNC: 132 MG/DL (ref 70–130)
GLUCOSE BLDC GLUCOMTR-MCNC: 168 MG/DL (ref 70–130)

## 2018-05-06 PROCEDURE — 82962 GLUCOSE BLOOD TEST: CPT

## 2018-05-06 PROCEDURE — 99232 SBSQ HOSP IP/OBS MODERATE 35: CPT | Performed by: INTERNAL MEDICINE

## 2018-05-06 PROCEDURE — 99221 1ST HOSP IP/OBS SF/LOW 40: CPT | Performed by: NURSE PRACTITIONER

## 2018-05-06 PROCEDURE — 25010000002 HEPARIN (PORCINE) PER 1000 UNITS: Performed by: INTERNAL MEDICINE

## 2018-05-06 RX ADMIN — HEPARIN SODIUM 5000 UNITS: 5000 INJECTION, SOLUTION INTRAVENOUS; SUBCUTANEOUS at 20:39

## 2018-05-06 RX ADMIN — HEPARIN SODIUM 5000 UNITS: 5000 INJECTION, SOLUTION INTRAVENOUS; SUBCUTANEOUS at 08:40

## 2018-05-06 RX ADMIN — ASPIRIN 81 MG 81 MG: 81 TABLET ORAL at 08:40

## 2018-05-06 RX ADMIN — TICAGRELOR 90 MG: 90 TABLET ORAL at 08:40

## 2018-05-06 RX ADMIN — NEBIVOLOL HYDROCHLORIDE 5 MG: 5 TABLET ORAL at 08:40

## 2018-05-06 RX ADMIN — TICAGRELOR 90 MG: 90 TABLET ORAL at 20:39

## 2018-05-06 RX ADMIN — ATORVASTATIN CALCIUM 80 MG: 40 TABLET, FILM COATED ORAL at 20:39

## 2018-05-06 NOTE — PLAN OF CARE
Problem: Patient Care Overview  Goal: Plan of Care Review  Outcome: Ongoing (interventions implemented as appropriate)   05/06/18 0593   Coping/Psychosocial   Plan of Care Reviewed With patient;spouse   Plan of Care Review   Progress no change   OTHER   Outcome Summary right groin site soft no bleeding sinus rhythm with run SVT and 3 beat vtach with out symptoms room air

## 2018-05-06 NOTE — PLAN OF CARE
"Problem: Patient Care Overview  Goal: Plan of Care Review  Outcome: Ongoing (interventions implemented as appropriate)   05/06/18 1502   Coping/Psychosocial   Plan of Care Reviewed With patient;family   Plan of Care Review   Progress improving   OTHER   Outcome Summary VSS, SR with some PVC's on monitor. Denies pain. Groin site without complications. Refusing insulin - states his DM is controlled with diet and has concerns of his \"sugar being too high\" in the 150's. Hopefully home tomorrow. Will continue to monitor.         "

## 2018-05-06 NOTE — PROGRESS NOTES
"[facility] at [facility]  IP Progress Note   LOS: 2 days   Patient Care Team:  No Known Provider as PCP - General    Chief Complaint: Follow up for Coronary Artery Disease/acute MI    Subjective    Feeling very well, has been walking around without problems.  Denies chest pain or shortness of breath.  Concerned about diabetes management.      Tele: Sinus Rythym    Vitals:  Blood pressure 104/69, pulse 64, temperature 98.1 °F (36.7 °C), temperature source Oral, resp. rate 18, height 177.8 cm (70\"), weight 92.3 kg (203 lb 6.4 oz), SpO2 95 %.     Intake/Output Summary (Last 24 hours) at 05/06/18 0806  Last data filed at 05/05/18 1400   Gross per 24 hour   Intake              240 ml   Output              400 ml   Net             -160 ml       Physical Exam:   General: No apparent distress.  Neck: no JVD.  Chest:No respiratory distress, breath sounds are normal. No wheezes,  rhonchi or rales.  Cardiovascular: Normal S1 and S2, no murmer, gallop or rub.    Extremities: No edema.    Results Review:     I reviewed the patient's new clinical results.      Results from last 7 days  Lab Units 05/05/18  0422   WBC 10*3/mm3 9.93   HEMOGLOBIN g/dL 14.5   HEMATOCRIT % 42.7   PLATELETS 10*3/mm3 222       Results from last 7 days  Lab Units 05/05/18  0422 05/04/18  1706   SODIUM mmol/L 138 137   POTASSIUM mmol/L 3.9 3.9   CHLORIDE mmol/L 105 107   CO2 mmol/L 29.0 24.0   BUN mg/dL 11 11   CREATININE mg/dL 0.90 0.80   CALCIUM mg/dL 9.2 8.5*   BILIRUBIN mg/dL  --  0.5   ALK PHOS U/L  --  50   ALT (SGPT) U/L  --  43*   AST (SGOT) U/L  --  94*   GLUCOSE mg/dL 173* 120*           Scheduled Meds:  aspirin 81 mg Oral Daily   atorvastatin 80 mg Oral Nightly   docusate sodium 100 mg Oral Daily   heparin (porcine) 5,000 Units Subcutaneous Q12H   insulin lispro 0-7 Units Subcutaneous 4x Daily With Meals & Nightly   nebivolol 5 mg Oral Q24H   pharmacy consult - MTM  Does not apply Daily   ticagrelor 90 mg Oral BID "       Assessment:   1. Acute Inferior STEMI  2. CAD, total occlusion of RCA, 30% plaque of the LAD, EF 50% with inferior hypokinesis.  3. 3.5x38 DINA to mid RCA.    4. Type 2 diabetes.    5. Dyslipidemia.   Plan:  1. Stable post MI/post procedure course.  2. We will check A1c level.  3. Continue current medications.  4. He lives in [place], will discharge tomorrow and he will follow-up with local cardiologist.      GILLIAN Balbuena  05/06/18  8:06 AM      I have seen and examined the patient, case was discussed with the physician extender, reviewed the above note, necessary changes were made and I agree with the final note.   Elliott Rolle MD, FACC, [facility]

## 2018-05-06 NOTE — PLAN OF CARE
Problem: Cardiac: ACS (Acute Coronary Syndrome) (Adult)  Intervention: Optimize Myocardial Oxygenation/Perfusion   05/06/18 0518   Activity   Activity Management activity adjusted per tolerance       Goal: Signs and Symptoms of Listed Potential Problems Will be Absent, Minimized or Managed (Cardiac: ACS)  Outcome: Ongoing (interventions implemented as appropriate)

## 2018-05-06 NOTE — CONSULTS
[facility] Medicine Services  CONSULT NOTE      Patient Name: Nicholas Ventura  : 1960  MRN: [id number]    Primary Care Physician: No Known Provider  Referring Provider: Louis Olivia MD    Subjective   Subjective     Reason for Consultation:  Medical management/DM    HPI:  Nicholas Ventura is a 58 y.o. male with hx of GERD, DM II, and HLD.  Pt was admitted to cardiology service in transfer from outside hospital secondary to inferior STEMI.  No history of prior CHF.  Underwent emergent catheter and stenting.  ICU post procedure.  Stabilizing transitioned to the floor.  Cardiology managing.  Hospitalist is been consulted for general medical management on transfer to the floor.      Review of Systems   Constitutional: Positive for fatigue. Negative for appetite change, diaphoresis and fever.   HENT: Negative.    Eyes: Negative.    Respiratory: Negative for cough, choking, shortness of breath and wheezing.    Cardiovascular: Negative for chest pain and leg swelling.   Gastrointestinal: Negative.    Genitourinary: Negative.    Musculoskeletal: Negative.    Neurological: Negative.    Psychiatric/Behavioral: Negative.        Otherwise 10-system ROS reviewed and is negative except as mentioned in the HPI.      Past Medical History:   Diagnosis Date   • Diabetes mellitus    • GERD (gastroesophageal reflux disease)    • Hyperlipidemia        History reviewed. No pertinent surgical history.    Family History: family history includes Cancer in his father and mother; No Known Problems in his daughter, daughter, sister, sister, and son; Obesity in his brother; Transient ischemic attack in his father.     Social History:  reports that he has never smoked. He has never used smokeless tobacco. He reports that he does not drink alcohol or use drugs.    Medications:  Prescriptions Prior to Admission   Medication Sig Dispense Refill Last Dose   • aspirin 81 MG chewable tablet Chew 81 mg Daily.       • coenzyme Q10 100 MG capsule Take 100 mg by mouth Daily.      • glucosamine sulfate 500 MG capsule capsule Take 1,200 mg by mouth 2 (Two) Times a Day With Meals.      • Multiple Vitamins-Minerals (MULTI COMPLETE PO) Take 1 tablet by mouth Daily.      • omeprazole (priLOSEC) 20 MG capsule Take 20 mg by mouth Daily.      • Red Yeast Rice 600 MG capsule Take 1,200 mg by mouth 2 (Two) Times a Day.      • tadalafil (CIALIS) 5 MG tablet Take 5 mg by mouth Daily As Needed for erectile dysfunction.          No Known Allergies    Objective   Objective     Vital Signs:   Temp:  [98.1 °F (36.7 °C)-98.9 °F (37.2 °C)] 98.1 °F (36.7 °C)  Heart Rate:  [64-76] 76  Resp:  [14-18] 18  BP: (104-131)/(62-78) 117/72     Physical Exam  Constitutional: No acute distress, awake, alert.  Sitting up in bed with wife at bs  Eyes: PERRLA, sclerae anicteric, no conjunctival injection  HENT: NCAT, mucous membranes moist.   Neck: Supple, no thyromegaly, no lymphadenopathy, trachea midline  Respiratory: Clear to auscultation bilaterally A/P, nonlabored respirations on RA.  Cardiovascular: RRR, no murmurs, rubs, or gallops, palpable pedal pulses bilaterally  Gastrointestinal: Positive bowel sounds, soft, nontender, nondistended.  Obese  Musculoskeletal: No bilateral ankle edema, no clubbing or cyanosis to extremities.  CULLEN spont  Psychiatric: Appropriate affect, cooperative and calm   Neurologic: Oriented x 3, strength symmetric in all extremities, Cranial Nerves grossly intact to confrontation, speech clear.  Follows commands   Skin: No rashes      Results Reviewed:  I have personally reviewed current lab, radiology, and data and agree.      Results from last 7 days  Lab Units 05/05/18  0422   WBC 10*3/mm3 9.93   HEMOGLOBIN g/dL 14.5   HEMATOCRIT % 42.7   PLATELETS 10*3/mm3 222       Results from last 7 days  Lab Units 05/05/18  0422 05/04/18  1706   SODIUM mmol/L 138 137   POTASSIUM mmol/L 3.9 3.9   CHLORIDE mmol/L 105 107   CO2 mmol/L 29.0  24.0   BUN mg/dL 11 11   CREATININE mg/dL 0.90 0.80   GLUCOSE mg/dL 173* 120*   CALCIUM mg/dL 9.2 8.5*   ALT (SGPT) U/L  --  43*   AST (SGOT) U/L  --  94*   TROPONIN I ng/mL 124.844* 30.910*     Estimated Creatinine Clearance: 100.9 mL/min (by C-G formula based on SCr of 0.9 mg/dL).  Brief Urine Lab Results     None        No results found for: BNP  No results found for: PHART    Microbiology Results Abnormal     None          Imaging Results (last 24 hours)     ** No results found for the last 24 hours. **        Results for orders placed during the hospital encounter of 05/04/18   Adult Transthoracic Echo Complete W/ Cont if Necessary Per Protocol    Narrative · Left ventricular systolic function is low normal. Estimated EF = 50%.  · Right ventricular cavity is mildly dilated.  · Trace mitral regurgitation, trace tricuspid regurgitation.          Assessment/Plan   Assessment / Plan     Hospital Problem List     * (Principal)STEMI involving right coronary artery    Diabetes mellitus    Diet-controlled hyperlipidemia    GERD (gastroesophageal reflux disease) (Chronic)        59 yo  male admitted in transfer from [facility] with inferior STEMI and underwent emergent cath on admit.  Went to ICU.  Stablized and transitioned to tele.  Hospitalist service was consulted on transfer to floor for general medical mgmt/dm mgmt.       Plan:  Inferior STEMI  --s/p emergent cath  --per cards  --on Asa, statin, Bystolic, and brinlinta    DM type II (A1c 6.9)  --glucoses running 130-168  --continue current regimen of LDSSI achs  --monitor    HLD  --high potency statin    GERD  --stable on no home meds  --pt prefers tums prn   --add PPI or H2 if indicated    Obesity    Disposition: per primary team TBD     Thank you for allowing [facility] Medicine Service to provide consultative care for your patient, we will continue to follow while clinically appropriate.    Electronically signed by EVARISTO Curtis,  05/06/18, 9:27 AM.

## 2018-05-07 VITALS
SYSTOLIC BLOOD PRESSURE: 114 MMHG | RESPIRATION RATE: 16 BRPM | HEIGHT: 70 IN | BODY MASS INDEX: 27.6 KG/M2 | HEART RATE: 76 BPM | TEMPERATURE: 97.8 F | WEIGHT: 192.8 LBS | DIASTOLIC BLOOD PRESSURE: 79 MMHG | OXYGEN SATURATION: 96 %

## 2018-05-07 LAB — GLUCOSE BLDC GLUCOMTR-MCNC: 128 MG/DL (ref 70–130)

## 2018-05-07 PROCEDURE — 99238 HOSP IP/OBS DSCHRG MGMT 30/<: CPT | Performed by: INTERNAL MEDICINE

## 2018-05-07 PROCEDURE — 99232 SBSQ HOSP IP/OBS MODERATE 35: CPT | Performed by: NURSE PRACTITIONER

## 2018-05-07 PROCEDURE — 82962 GLUCOSE BLOOD TEST: CPT

## 2018-05-07 RX ORDER — ATORVASTATIN CALCIUM 80 MG/1
80 TABLET, FILM COATED ORAL NIGHTLY
Qty: 30 TABLET | Refills: 11 | Status: SHIPPED | OUTPATIENT
Start: 2018-05-07 | End: 2018-05-10 | Stop reason: SDUPTHER

## 2018-05-07 RX ORDER — NEBIVOLOL 5 MG/1
5 TABLET ORAL DAILY
Qty: 30 TABLET | Refills: 11 | Status: SHIPPED | OUTPATIENT
Start: 2018-05-07

## 2018-05-07 RX ADMIN — ASPIRIN 81 MG 81 MG: 81 TABLET ORAL at 09:07

## 2018-05-07 RX ADMIN — NEBIVOLOL HYDROCHLORIDE 5 MG: 5 TABLET ORAL at 09:07

## 2018-05-07 RX ADMIN — TICAGRELOR 90 MG: 90 TABLET ORAL at 09:07

## 2018-05-07 NOTE — PROGRESS NOTES
Pt. Referred for Phase II Cardiac Rehab. Staff discussed benefits of exercise, program protocol, and educational material provided. Teach back verified.  Pt. Refused Cardiac Rehab services at this time because he lives in [place]. Pt. Will call staff if he decides to participate in a Cardiac Rehab program near him. Staff discussed AHA exercise recommendations and home exercise guidelines. Teach back verified.

## 2018-05-07 NOTE — PAYOR COMM NOTE
"Nicholas Peralta (58 y.o. Male)   Id # [id number]  Jovana Cardoza RN, BSN  Phone # [phone]  Fax # [phone]    Date of Birth Social Security Number Address Home Phone MRN    1960  [address] [place] [address] [phone] [id number]    Congregation Marital Status          Unknown Unknown       Admission Date Admission Type Admitting Provider Attending Provider Department, Room/Bed    5/4/18 Urgent Louis Olivia MD  [facility] 6A, N603/1    Discharge Date Discharge Disposition Discharge Destination        5/7/2018 Home or Self Care              Attending Provider:  (none)   Allergies:  No Known Allergies    Isolation:  None   Infection:  None   Code Status:  FULL    Ht:  177.8 cm (70\")   Wt:  87.5 kg (192 lb 12.8 oz)    Admission Cmt:  None   Principal Problem:  STEMI involving right coronary artery [I21.11]                 Active Insurance as of 5/4/2018     Primary Coverage     Payor Plan Insurance Group Employer/Plan Group    [id number]      Payor Plan Address Payor Plan Phone Number Effective From Effective To    [address] [phone] 1/1/2018     [place] [address]       Subscriber Name Subscriber Birth Date Member ID       NICHOLAS PERALTA 1960 [id number]                 Emergency Contacts      (Rel.) Home Phone Work Phone Mobile Phone    Cindy Peralta (Spouse) -- -- [phone]               History & Physical      Louis Olivia MD at 5/4/2018  3:55 PM          [facility]   History and physical    Patient Care Team:  No Known Provider as PCP - General     Problem List:  1. STEMI  2. Diet controlled diabetes  3. GERD  4. Surgeries:  1. None       Allergies no known allergies        Current Facility-Administered Medications:   •  bivalirudin (ANGIOMAX) bolus from bag, , , PRN, Louis Olivia MD, 67.05 mg at 05/04/18 1602  •  fentaNYL citrate (PF) (SUBLIMAZE) injection, , , PRN, " Louis Olivia MD, 50 mcg at 05/04/18 1558  •  lidocaine PF 1% (XYLOCAINE) injection, , , PRN, Louis Olivia MD, 10 mL at 05/04/18 1559  •  midazolam (VERSED) injection, , , PRN, Louis Olivia MD, 2 mg at 05/04/18 1558         No prescriptions prior to admission.     Home medications  Prilosec  Cialis PRN    Subjective .   History of present illness:    Patient presents today for emergent cardiac catheterization due to inferior STEMI. No previous history of CAD. Has had some recurrent chest pain over the last couple of weeks. Today he had sudden onset of severe left sided chest burning sensation that did not go away. After this persisted for a few hours he presented to the closest ER for further evaluation. There he was diagnosed with an inferior STEMI and transferred for emergent cath. On arrival complains of a burning sensation in his chest with still persistent ST elevation.    Patient is a  who resides in [place].    Social History     Social History   • Marital status: Unknown     Spouse name: N/A   • Number of children: N/A   • Years of education: N/A     Occupational History   • Not on file.     Social History Main Topics   • Smoking status: Never Smoker   • Smokeless tobacco: Not on file   • Alcohol use No   • Drug use: No   • Sexual activity: Not on file     Other Topics Concern   • Not on file     Social History Narrative   • No narrative on file     Family History   Problem Relation Age of Onset   • Transient ischemic attack Father          Review of Systems:  Review of Systems   Constitution: Negative for fever, weakness and malaise/fatigue.   HENT: Negative for hoarse voice, nosebleeds and tinnitus.    Eyes: Negative for pain and visual disturbance.   Cardiovascular: Positive for chest pain. Negative for claudication, cyanosis, dyspnea on exertion, irregular heartbeat, leg swelling, near-syncope, orthopnea, palpitations, paroxysmal nocturnal dyspnea and syncope.   Respiratory: Negative  "for cough, hemoptysis, shortness of breath, sleep disturbances due to breathing, snoring, sputum production and wheezing.    Endocrine: Negative for polydipsia and polyuria.   Hematologic/Lymphatic: Negative for bleeding problem. Does not bruise/bleed easily.   Skin: Negative for itching, rash and skin cancer.   Musculoskeletal: Negative for arthritis, back pain, joint pain, joint swelling and myalgias.   Gastrointestinal: Positive for heartburn. Negative for abdominal pain, anorexia, constipation, diarrhea, hematemesis, hematochezia, melena, nausea and vomiting.   Genitourinary: Negative for bladder incontinence, dysuria and hematuria.   Neurological: Negative for disturbances in coordination, dizziness, focal weakness, headaches, light-headedness, loss of balance, numbness, seizures and vertigo.   Psychiatric/Behavioral: Negative for altered mental status, depression and memory loss. The patient is not nervous/anxious.    Allergic/Immunologic: Negative for hives and persistent infections.        Objective   Vitals:  Blood pressure 153/82, pulse 74, resp. rate 16, height 177.8 cm (70\"), weight 89.4 kg (197 lb), SpO2 98 %.     Physical Exam   Constitutional: He is oriented to person, place, and time. He appears well-developed and well-nourished. No distress.   HENT:   Head: Normocephalic and atraumatic.   Mouth/Throat: Oropharynx is clear and moist.   Eyes: Right eye exhibits no discharge. Left eye exhibits no discharge.   Neck: No JVD present. Carotid bruit is not present.   No bruit auscultated bilaterally   Cardiovascular: Normal rate, regular rhythm, S1 normal, S2 normal, normal heart sounds and intact distal pulses.  Exam reveals no gallop, no S3 and no S4.    No murmur heard.  Pulses:       Carotid pulses are 2+ on the right side, and 2+ on the left side.       Radial pulses are 2+ on the right side, and 2+ on the left side.   Pulmonary/Chest: Effort normal and breath sounds normal. No respiratory distress. " "  Abdominal: Soft. Bowel sounds are normal. There is no tenderness.   Musculoskeletal: He exhibits no edema.   Neurological: He is alert and oriented to person, place, and time.   Skin: Skin is warm and dry. No rash noted.            Results Review:  No labs sent from outside facility with patient. We will check them here.      Tele:  SR    EKG: SR with inferior elevation and septal depression      Assessment/Plan     1. Inferior STEMI  2. Diabetes       Plan:    1. Emergent left heart cath with possible intervention. Further recommendations to follow cath. Upon discharge will need to schedule follow-up with a cardiologist in [place] where he is from.    Louis Olivia MD  05/04/18  4:04 PM    Dictated utilizing Dragon dictation      Electronically signed by Louis Olivia MD at 5/4/2018  4:42 PM       Emergency Department Notes     No notes of this type exist for this encounter.        Operative/Procedure Notes (last 7 days) (Notes from 4/30/2018  2:41 PM through 5/7/2018  2:41 PM)     No notes of this type exist for this encounter.           Physician Progress Notes (last 7 days) (Notes from 4/30/2018  2:41 PM through 5/7/2018  2:41 PM)      Elliott Rolle MD at 5/6/2018  8:06 AM          [facility] at [facility]  IP Progress Note   LOS: 2 days   Patient Care Team:  No Known Provider as PCP - General    Chief Complaint: Follow up for Coronary Artery Disease/acute MI    Subjective    Feeling very well, has been walking around without problems.  Denies chest pain or shortness of breath.  Concerned about diabetes management.      Tele: Sinus Rythym    Vitals:  Blood pressure 104/69, pulse 64, temperature 98.1 °F (36.7 °C), temperature source Oral, resp. rate 18, height 177.8 cm (70\"), weight 92.3 kg (203 lb 6.4 oz), SpO2 95 %.     Intake/Output Summary (Last 24 hours) at 05/06/18 0806  Last data filed at 05/05/18 1400   Gross per 24 hour   Intake              240 ml   Output              400 " ml   Net             -160 ml       Physical Exam:   General: No apparent distress.  Neck: no JVD.  Chest:No respiratory distress, breath sounds are normal. No wheezes,  rhonchi or rales.  Cardiovascular: Normal S1 and S2, no murmer, gallop or rub.    Extremities: No edema.    Results Review:     I reviewed the patient's new clinical results.      Results from last 7 days  Lab Units 05/05/18  0422   WBC 10*3/mm3 9.93   HEMOGLOBIN g/dL 14.5   HEMATOCRIT % 42.7   PLATELETS 10*3/mm3 222       Results from last 7 days  Lab Units 05/05/18  0422 05/04/18  1706   SODIUM mmol/L 138 137   POTASSIUM mmol/L 3.9 3.9   CHLORIDE mmol/L 105 107   CO2 mmol/L 29.0 24.0   BUN mg/dL 11 11   CREATININE mg/dL 0.90 0.80   CALCIUM mg/dL 9.2 8.5*   BILIRUBIN mg/dL  --  0.5   ALK PHOS U/L  --  50   ALT (SGPT) U/L  --  43*   AST (SGOT) U/L  --  94*   GLUCOSE mg/dL 173* 120*           Scheduled Meds:  aspirin 81 mg Oral Daily   atorvastatin 80 mg Oral Nightly   docusate sodium 100 mg Oral Daily   heparin (porcine) 5,000 Units Subcutaneous Q12H   insulin lispro 0-7 Units Subcutaneous 4x Daily With Meals & Nightly   nebivolol 5 mg Oral Q24H   pharmacy consult - MTM  Does not apply Daily   ticagrelor 90 mg Oral BID       Assessment:   1. Acute Inferior STEMI  2. CAD, total occlusion of RCA, 30% plaque of the LAD, EF 50% with inferior hypokinesis.  3. 3.5x38 DINA to mid RCA.    4. Type 2 diabetes.    5. Dyslipidemia.   Plan:  1. Stable post MI/post procedure course.  2. We will check A1c level.  3. Continue current medications.  4. He lives in [place], will discharge tomorrow and he will follow-up with local cardiologist.      GILLIAN Balbuena  05/06/18  8:06 AM      I have seen and examined the patient, case was discussed with the physician extender, reviewed the above note, necessary changes were made and I agree with the final note.   Elliott Rolle MD, [facility] with [facility], [facility]                              Electronically signed by Elliott Rolle MD at  5/6/2018 11:45 AM     Aldo Escobar MD at 5/5/2018  2:46 PM          Intensive Care Follow-up      LOS: 1 day     Mr. Nicholas Ventura, 58 y.o. male is followed for: STEMI involving right coronary artery     Subjective - Interval History     Patient is awake and alert and has no further chest pain.  He did complain of some right groin pain earlier.    The patient's relevant past medical, surgical and social history were reviewed and updated in Epic as appropriate.     Objective     Infusions:     Medications:    aspirin 81 mg Oral Daily   atorvastatin 80 mg Oral Nightly   docusate sodium 100 mg Oral Daily   heparin (porcine) 5,000 Units Subcutaneous Q12H   insulin lispro 0-7 Units Subcutaneous 4x Daily With Meals & Nightly   nebivolol 5 mg Oral Q24H   pharmacy consult - MTM  Does not apply Daily   ticagrelor 90 mg Oral BID       Vital Sign Min/Max for last 24 hours  Temp  Min: 98 °F (36.7 °C)  Max: 98.6 °F (37 °C)   BP  Min: 101/88  Max: 153/82   Pulse  Min: 64  Max: 99   Resp  Min: 14  Max: 18   SpO2  Min: 94 %  Max: 98 %   No Data Recorded      Intake/Output Summary (Last 24 hours) at 05/05/18 1446  Last data filed at 05/05/18 1400   Gross per 24 hour   Intake              720 ml   Output             2855 ml   Net            -2135 ml           Physical Exam:   GENERAL: Awake and alert   HEENT: Normocephalic   LUNGS: Clear   HEART: Normal S1 and S2   ABDOMEN: Soft   EXTREMITIES: No edema   NEURO/PSYCH: No focal deficits      Results from last 7 days  Lab Units 05/05/18  0422 05/04/18  1706   WBC 10*3/mm3 9.93 8.54   HEMOGLOBIN g/dL 14.5 13.8   PLATELETS 10*3/mm3 222 201       Results from last 7 days  Lab Units 05/05/18  0422 05/04/18  1706   SODIUM mmol/L 138 137   POTASSIUM mmol/L 3.9 3.9   CO2 mmol/L 29.0 24.0   BUN mg/dL 11 11   CREATININE mg/dL 0.90 0.80   MAGNESIUM mg/dL  --  2.0   GLUCOSE mg/dL 173* 120*     Estimated Creatinine Clearance: 100.9 mL/min (by C-G formula based on SCr of 0.9  "mg/dL).      Results from last 7 days  Lab Units 05/05/18  0422   HEMOGLOBIN A1C % 6.90*           No results found for: LACTATE       Images: No new imaging    I reviewed the patient's results and images.     Impression      Hospital Problem List     * (Principal)STEMI involving right coronary artery          Diabetes mellitus          Diet-controlled hyperlipidemia                     Plan        Patient seems be doing well following his MI.  He's preceding per protocol.  He is awaiting transfer to the floor.  We'll continue supportive measures.     Plan of care and goals reviewed with nurse at daily rounds.   I discussed the patient's findings and my recommendations with patient, family and nursing staff       Aldo Escobar MD  Pulmonary and Critical Care Medicine  05/05/18 2:46 PM         Electronically signed by Aldo Escobar MD at 5/5/2018  2:48 PM     Elliott Rolle MD at 5/5/2018  8:01 AM          [facility] at [facility]  IP Progress Note      Chief Complaint: Follow-up of acute MI/CAD.    Subjective:  Feels a lot better than how he did yesterday.  Still has very mild lower sternal pressure.  Coronary bed to chair without problems.  No palpitations.  No nausea or vomiting.    Objective:  Blood pressure 110/74, pulse 68, temperature 98.5 °F (36.9 °C), temperature source Oral, resp. rate 18, height 177.8 cm (70\"), weight 92.3 kg (203 lb 6.4 oz), SpO2 95 %.     Intake/Output Summary (Last 24 hours) at 05/05/18 0801  Last data filed at 05/05/18 0700   Gross per 24 hour   Intake              240 ml   Output             2155 ml   Net            -1915 ml       Physical Exam:  General: No acute distress.   Neck: no JVD.  Chest:No respiratory distress, breath sounds are normal. No wheezes,  rhonchi or rales.  Cardiovascular: Normal S1 and S2, no murmer, gallop or rub.    Extremities: No edema.Rt groin intact, no bleeding or hematoma.    Results Review:     I reviewed the patient's new clinical " results.      Results from last 7 days  Lab Units 05/05/18  0422   WBC 10*3/mm3 9.93   HEMOGLOBIN g/dL 14.5   HEMATOCRIT % 42.7   PLATELETS 10*3/mm3 222       Results from last 7 days  Lab Units 05/05/18  0422 05/04/18  1706   SODIUM mmol/L 138 137   POTASSIUM mmol/L 3.9 3.9   CHLORIDE mmol/L 105 107   CO2 mmol/L 29.0 24.0   BUN mg/dL 11 11   CREATININE mg/dL 0.90 0.80   CALCIUM mg/dL 9.2 8.5*   BILIRUBIN mg/dL  --  0.5   ALK PHOS U/L  --  50   ALT (SGPT) U/L  --  43*   AST (SGOT) U/L  --  94*   GLUCOSE mg/dL 173* 120*       Results from last 7 days  Lab Units 05/05/18  0422   SODIUM mmol/L 138   POTASSIUM mmol/L 3.9   CHLORIDE mmol/L 105   CO2 mmol/L 29.0   BUN mg/dL 11   CREATININE mg/dL 0.90   GLUCOSE mg/dL 173*   CALCIUM mg/dL 9.2         Lab Results   Component Value Date    TROPONINI 124.844 (C) 05/05/2018       Results from last 7 days  Lab Units 05/05/18  0422   TSH mIU/mL 2.353       Results from last 7 days  Lab Units 05/05/18  0422   CHOLESTEROL mg/dL 163   TRIGLYCERIDES mg/dL 154*   HDL CHOL mg/dL 38*   LDL CHOL mg/dL 115           Tele: Sinus Rythym      Assessment:   1. Acute Inferior STEMI  2. CAD, total occlusion of RCA, 30% plaque of the LAD, EF 50% with inferior hypokinesis.  3. 3.5x38 DINA to mid RCA.    4. Type 2 diabetes.    5. Dyslipidemia.   Plan:  1. Stable post MI/post procedure course.  2. Transfer to telemetry.  3. Increase activities, continue current medications, watch over the weekend, hopefully home Monday.    Elliott Rolle MD, [facility], [facility]                                Electronically signed by Elliott Rolle MD at 5/5/2018  9:20 AM     Adams John MD at 5/4/2018  5:35 PM          INTENSIVIST   PROGRESS NOTE     Hospital:  LOS: 0 days   Subjective   S     Mr. Nicholas Ventura, 58 y.o. male is followed for:      STEMI involving right coronary artery    Diabetes mellitus    As an Intensivist, we provide an integrated approach to the ICU patient and family, medical management of comorbid  conditions, lead interdisciplinary rounds and coordinate the care with all other services, including those from other specialists.     Interval History:    Day of Surgery [facility]     Nicholas Ventura is a 58 y.o. male presented to [facility] today for chest pressure/burning from an OSH ([place]).      He has been having this burning sensation intermittently for the last  couple months.  He saw his PCP (SALVATORE) last week and had an EKG and 24 hour heart monitor.  He was to follow-up on Monday.      He is a  and started having this pressure and went to an OSH for treatment.  He was found to have an inferior STEMI and transferred to [facility] for treament.    He was taken emergently to the Cath Lab upon arrival and was found to have an occluded RCA.  He received a DINA stent to the RCA.  His LVEF was 45-50%.     He has arrived to the ICU from the cath lab.  He has no chest pain.  He has a right groin puncture site that is oozing.  He is hemodynamically stable.     The patient's relevant past medical, surgical and social history were reviewed and updated in Epic as appropriate.     PMH: He  has a past medical history of Diabetes mellitus and Hyperlipidemia.   PSxH: He  has no past surgical history on file.      Medications:  No current facility-administered medications on file prior to encounter.      No current outpatient prescriptions on file prior to encounter.       Allergies: He has No Known Allergies.   FH: His family history includes Cancer in his father and mother; Transient ischemic attack in his father.   SH: He  reports that he has never smoked. He has never used smokeless tobacco. He reports that he does not drink alcohol or use drugs.     ROS:   Constitutional: Negative for fever.   Respiratory: Negative for dyspnea.   Cardiovascular: Negative for chest pain.   Gastrointestinal: Negative for  nausea, vomiting and diarrhea.   All other systems reviewed and are negative.    Objective   O     Vitals:  Temp: 98 °F  (36.7 °C) (05/04/18 1700) Temp  Min: 98 °F (36.7 °C)  Max: 98 °F (36.7 °C)   BP: 128/88 (05/04/18 1700) BP  Min: 117/76  Max: 153/82   Pulse: 95 (05/04/18 1700) Pulse  Min: 74  Max: 99   Resp: 16 (05/04/18 1639) Resp  Min: 16  Max: 16   SpO2: 98 % (05/04/18 1700) SpO2  Min: 97 %  Max: 98 %   Device: room air (05/04/18 1700)    Flow Rate:   No Data Recorded     Physical Examination  Telemetry:  Rhythm: normal sinus rhythm (05/04/18 1700)         Constitutional:  No acute distress.   Cardiovascular: Normal rate, regular rhythm. Normal heart sounds.  No murmurs, gallop or rub.   Respiratory: No respiratory distress. Normal respiratory effort.  Normal breath sounds  Clear to auscultation and percussion.    Abdominal:  Soft. No masses. Non-tender. No distension. No HSM.   Extremities: No digital cyanosis. No clubbing.  No peripheral edema.   Neurological:   Alert and Oriented to person, place, and time.     Lines/Drains/Airways: Peripheral IV(s)     Hematology:    Results from last 7 days  Lab Units 05/04/18  1706   WBC 10*3/mm3 8.54   HEMOGLOBIN g/dL 13.8   MCV fL 84.5   PLATELETS 10*3/mm3 201     Chemistry:    Results from last 7 days  Lab Units 05/04/18  1706   SODIUM mmol/L 137   POTASSIUM mmol/L 3.9   CHLORIDE mmol/L 107   CO2 mmol/L 24.0   ANION GAP mmol/L 6.0   GLUCOSE mg/dL 120*   CALCIUM mg/dL 8.5*       Results from last 7 days  Lab Units 05/04/18  1706   BUN mg/dL 11   CREATININE mg/dL 0.80       Results from last 7 days  Lab Units 05/04/18  1706   MAGNESIUM mg/dL 2.0     Estimated Creatinine Clearance: 113.5 mL/min (by C-G formula based on SCr of 0.8 mg/dL).    Hepatic:    Results from last 7 days  Lab Units 05/04/18  1706   ALK PHOS U/L 50   BILIRUBIN mg/dL 0.5   ALT (SGPT) U/L 43*   AST (SGOT) U/L 94*   ALBUMIN g/dL 3.80     Images:  No radiology results for the last day    Results: Reviewed.  I reviewed the patient's new laboratory and imaging results.  I independently reviewed the patient's new  images.    Medications: Reviewed.    Assessment/Plan   A / P     58 y.o.male, admitted on 2018 with STEMI involving right coronary artery [I21.11]:     3. Inferior STEMI  1. [facility] (2018) Xience (Everolimus) EES to RCA  2. EF 45-50%  3. Inferior wall, severe HK  4. Dyslipidemia  5. Antibiotics: none  6. Overweight. Body mass index is 29.18 kg/m².   7. T2DM (diet controlled)    Results from last 7 days  Lab Units 18  1721   GLUCOSE mg/dL 109     No results found for: HGBA1C    Nutrition Support: Patient isn't on Tube Feeding   Modulars: Patient doesn't have any tube feeding modular orders   Diet: Diet Regular; Consistent Carbohydrate, Cardiac No active supplement orders     Advance Directives: Full Code       Assessment / Plan:    2. ICU admission post [facility]  3. Glucose < 180  4. HbA1c in AM  Low cholesterol <200 mg.dl and low saturated fat <7% diet  LDL goal < 70 mg/dl  BP < 130/80 if DM or CKD  HbA1c controlled  Exercise at least 30 minutes 3-4 times per week  BMI 18.5-24.9  Annual influenza vaccine  5. ASA 81 mg/day  6. P2Y12 inhibitor for 12 months  7. b-Blocker  8. High dose statins  9. ACEI or ARB  10. Nitrates prn symptoms    Plan of care and goals reviewed during interdisciplinary rounds.  Level of Risk is High due to:  illness with threat to life or bodily function.   I discussed the patient's findings and my recommendations with patient    Adams John MD, FACP, FCCP, CNSC  Intensive Care Medicine, Nutrition Support and Pulmonary Medicine       Electronically signed by Adams John MD at 2018  7:12 PM          Consult Notes (last 7 days) (Notes from 18 through 18)      EVARISTO Curtis at 2018  9:00 AM               [facility]  CONSULT NOTE      Patient Name: Nicholas Ventura  : 1960  MRN: [id number]    Primary Care Physician: No Known Provider  Referring Provider: Louis Olivia MD    Subjective   Subjective      Reason for Consultation:  Medical management/DM    HPI:  Nicholas Ventura is a 58 y.o. male with hx of GERD, DM II, and HLD.  Pt was admitted to cardiology service in transfer from outside hospital secondary to inferior STEMI.  No history of prior CHF.  Underwent emergent catheter and stenting.  ICU post procedure.  Stabilizing transitioned to the floor.  Cardiology managing.  Hospitalist is been consulted for general medical management on transfer to the floor.      Review of Systems   Constitutional: Positive for fatigue. Negative for appetite change, diaphoresis and fever.   HENT: Negative.    Eyes: Negative.    Respiratory: Negative for cough, choking, shortness of breath and wheezing.    Cardiovascular: Negative for chest pain and leg swelling.   Gastrointestinal: Negative.    Genitourinary: Negative.    Musculoskeletal: Negative.    Neurological: Negative.    Psychiatric/Behavioral: Negative.        Otherwise 10-system ROS reviewed and is negative except as mentioned in the HPI.      Past Medical History:   Diagnosis Date   • Diabetes mellitus    • GERD (gastroesophageal reflux disease)    • Hyperlipidemia        History reviewed. No pertinent surgical history.    Family History: family history includes Cancer in his father and mother; No Known Problems in his daughter, daughter, sister, sister, and son; Obesity in his brother; Transient ischemic attack in his father.     Social History:  reports that he has never smoked. He has never used smokeless tobacco. He reports that he does not drink alcohol or use drugs.    Medications:  Prescriptions Prior to Admission   Medication Sig Dispense Refill Last Dose   • aspirin 81 MG chewable tablet Chew 81 mg Daily.      • coenzyme Q10 100 MG capsule Take 100 mg by mouth Daily.      • glucosamine sulfate 500 MG capsule capsule Take 1,200 mg by mouth 2 (Two) Times a Day With Meals.      • Multiple Vitamins-Minerals (MULTI COMPLETE PO) Take 1 tablet by mouth Daily.       • omeprazole (priLOSEC) 20 MG capsule Take 20 mg by mouth Daily.      • Red Yeast Rice 600 MG capsule Take 1,200 mg by mouth 2 (Two) Times a Day.      • tadalafil (CIALIS) 5 MG tablet Take 5 mg by mouth Daily As Needed for erectile dysfunction.          No Known Allergies    Objective   Objective     Vital Signs:   Temp:  [98.1 °F (36.7 °C)-98.9 °F (37.2 °C)] 98.1 °F (36.7 °C)  Heart Rate:  [64-76] 76  Resp:  [14-18] 18  BP: (104-131)/(62-78) 117/72     Physical Exam  Constitutional: No acute distress, awake, alert.  Sitting up in bed with wife at bs  Eyes: PERRLA, sclerae anicteric, no conjunctival injection  HENT: NCAT, mucous membranes moist.   Neck: Supple, no thyromegaly, no lymphadenopathy, trachea midline  Respiratory: Clear to auscultation bilaterally A/P, nonlabored respirations on RA.  Cardiovascular: RRR, no murmurs, rubs, or gallops, palpable pedal pulses bilaterally  Gastrointestinal: Positive bowel sounds, soft, nontender, nondistended.  Obese  Musculoskeletal: No bilateral ankle edema, no clubbing or cyanosis to extremities.  CULLEN spont  Psychiatric: Appropriate affect, cooperative and calm   Neurologic: Oriented x 3, strength symmetric in all extremities, Cranial Nerves grossly intact to confrontation, speech clear.  Follows commands   Skin: No rashes      Results Reviewed:  I have personally reviewed current lab, radiology, and data and agree.      Results from last 7 days  Lab Units 05/05/18  0422   WBC 10*3/mm3 9.93   HEMOGLOBIN g/dL 14.5   HEMATOCRIT % 42.7   PLATELETS 10*3/mm3 222       Results from last 7 days  Lab Units 05/05/18  0422 05/04/18  1706   SODIUM mmol/L 138 137   POTASSIUM mmol/L 3.9 3.9   CHLORIDE mmol/L 105 107   CO2 mmol/L 29.0 24.0   BUN mg/dL 11 11   CREATININE mg/dL 0.90 0.80   GLUCOSE mg/dL 173* 120*   CALCIUM mg/dL 9.2 8.5*   ALT (SGPT) U/L  --  43*   AST (SGOT) U/L  --  94*   TROPONIN I ng/mL 124.844* 30.910*     Estimated Creatinine Clearance: 100.9 mL/min (by C-G  formula based on SCr of 0.9 mg/dL).  Brief Urine Lab Results     None        No results found for: BNP  No results found for: PHART    Microbiology Results Abnormal     None          Imaging Results (last 24 hours)     ** No results found for the last 24 hours. **        Results for orders placed during the hospital encounter of 05/04/18   Adult Transthoracic Echo Complete W/ Cont if Necessary Per Protocol    Narrative · Left ventricular systolic function is low normal. Estimated EF = 50%.  · Right ventricular cavity is mildly dilated.  · Trace mitral regurgitation, trace tricuspid regurgitation.          Assessment/Plan   Assessment / Plan     Hospital Problem List     * (Principal)STEMI involving right coronary artery    Diabetes mellitus    Diet-controlled hyperlipidemia    GERD (gastroesophageal reflux disease) (Chronic)        59 yo  male admitted in transfer from OSH with inferior STEMI and underwent emergent cath on admit.  Went to ICU.  Stablized and transitioned to tele.  Hospitalist service was consulted on transfer to floor for general medical mgmt/dm mgmt.       Plan:  Inferior STEMI  --s/p emergent cath  --per cards  --on Asa, statin, Bystolic, and brinlinta    DM type II (A1c 6.9)  --glucoses running 130-168  --continue current regimen of LDSSI achs  --monitor    HLD  --high potency statin    GERD  --stable on no home meds  --pt prefers tums prn   --add PPI or H2 if indicated    Obesity    Disposition: per primary team TBD     Thank you for allowing [facility] Medicine Service to provide consultative care for your patient, we will continue to follow while clinically appropriate.    Electronically signed by EVARISTO Curtis, 05/06/18, 9:27 AM.            Electronically signed by EVARISTO Curtis at 5/6/2018  1:29 PM     Interpretation Summary     · Left ventricular systolic function is low normal. Estimated EF = 50%.  · Right ventricular cavity is mildly  dilated.  · Trace mitral regurgitation, trace tricuspid regurgitation.     Physicians     Panel Physicians Referring Physician Case Authorizing Physician   Louis Olivia MD (Primary)         Conclusion        Clinical History: Inferior STEMI     Access Site:  Right femoral artery     Procedure narrative:  Left heart catheterization ventriculography subsequent return to the respiratory status if his catheters.  Bivalirudin, and 2 boluses of Integrilin were given for anticoagulation during the intervention.  Through a JR4 guide, a whisper wire was passed across the occluded proximal RCA.  Over this the lesion was PTCA with a 2.5 x 20 Trek balloon.  With this, the patient had significant bradycardia requiring some atropine.  IntraCorp nitroglycerin and Cardene were given to relieve distal vasospasm, and patient's flow improved from KATERIN grade 0-282 with this.  There is note made of a long area of significant plaque, that was stented with a 3.5 x 38 Xience Alpine everolimus alluding stent to 15 chriss.  Excellent angiographic results ensued.  There is loss of a small RV marginal branch.  There is still persistent [place] 2 flow in the right PDA only, that appear to be due to microvascular obstruction, this responded to intracoronary Cardene and nitroglycerin since the patient left the catheter lab with complete resolution of ST segments, and KATERIN grade 3 flow in all vessels.  Sheath was removed and hemostasis obtained using a 6 Armenian VIP Angio-Seal.       IMPRESSION:  1.  Acute inferior STEMI and occluded RCA treated with 3.5 x 38 mm Xience EES.  2.  30% eccentric ostial LAD stenosis  4.  LVEF 45-50 percent with inferior severe hypokinesis     RECOMMENDATIONS:  Successful revascularization of culprit vessel.  Patient has been started on beta blocker and aspirin, Brilinta, and statin.     Angiographic Findings:  right coronary dominance  · LM:  Normal vessel  · LAD: Moderate to large size vessel dressed in 1 moderate size  high diagonal branch.  In the ostium the LAD there is some eccentric 30% plaque.  Had mild luminal irregularities only.  · LCX: Moderate size though nondominant vessel bifurcating into 2 large marginal branches.  No angiographic atherosclerosis  · RCA: Dominant vessel.  This vessel is occluded just after the RV marginal one branch proximally.     LV: LVEF 50% with inferior hypokinesis  Mitral regurgitation: None     Hemodynamic Findings:  Ao pressure: 106/58 mmHg  LVEDP: 16 mmHg     Postintervention angiography:  RCA: Widely patent EES in the proximal and mid RCA.  No residual stenosis.  KATERIN-3 flow in all distal branches.         Complications: There are no signs of early complications     Contrast: 150 mL Isovue 370

## 2018-05-07 NOTE — PROGRESS NOTES
[facility] Medicine Services  PROGRESS NOTE    Patient Name: Nicholas Ventura  : 1960  MRN: [id number]    Date of Admission: 2018  Length of Stay: 3  Primary Care Physician: No Known Provider    Subjective   Subjective     CC:  F/u medical/DM mgmt    HPI:  Pt is seen resting up in bed with multiple family members at .  States he feels good.  Tolerating diet.  No n/v, abd pain, cp or soa.  No new issues. Wants to dc home.       Review of Systems  Gen- No fevers, chills  CV- No chest pain, palpitations  Resp- No cough, dyspnea  GI- No N/V/D, abd pain      Otherwise ROS is negative except as mentioned in the HPI.    Objective   Objective     Vital Signs:   Temp:  [97.6 °F (36.4 °C)-98.6 °F (37 °C)] 97.8 °F (36.6 °C)  Heart Rate:  [63-76] 76  Resp:  [16] 16  BP: (106-122)/(65-85) 114/79        Physical Exam:  Constitutional: No acute distress, awake, alert.  Sitting up in bed with multiple family members at   Eyes: PERRLA, sclerae anicteric, no conjunctival injection  HENT: NCAT, mucous membranes moist.   Neck: Supple, trachea midline  Respiratory: Clear to auscultation bilaterally A/P, nonlabored respirations on RA.    Cardiovascular: RRR, no murmurs, rubs, or gallops, palpable pedal pulses bilaterally  Gastrointestinal: Positive bowel sounds, soft, nontender, nondistended.  Obese abd  Musculoskeletal: No bilateral ankle edema, no clubbing or cyanosis to extremities.  CULLEN spont  Psychiatric: Appropriate affect, cooperative and calm   Neurologic: Oriented x 3, strength symmetric in all extremities, Cranial Nerves grossly intact to confrontation, speech clear.  Follows commands   Skin: No rashes        Results Reviewed:  I have personally reviewed current lab, radiology, and data and agree.      Results from last 7 days  Lab Units 18  0422 18  1706   WBC 10*3/mm3 9.93 8.54   HEMOGLOBIN g/dL 14.5 13.8   HEMATOCRIT % 42.7 40.9   PLATELETS 10*3/mm3 222 201       Results  from last 7 days  Lab Units 05/05/18  0422 05/04/18  1706   SODIUM mmol/L 138 137   POTASSIUM mmol/L 3.9 3.9   CHLORIDE mmol/L 105 107   CO2 mmol/L 29.0 24.0   BUN mg/dL 11 11   CREATININE mg/dL 0.90 0.80   GLUCOSE mg/dL 173* 120*   CALCIUM mg/dL 9.2 8.5*   ALT (SGPT) U/L  --  43*   AST (SGOT) U/L  --  94*   TROPONIN I ng/mL 124.844* 30.910*     Estimated Creatinine Clearance: 109.4 mL/min (by C-G formula based on SCr of 0.9 mg/dL).  No results found for: BNP  No results found for: PHART    Microbiology Results Abnormal     None          Imaging Results (last 24 hours)     ** No results found for the last 24 hours. **        Results for orders placed during the hospital encounter of 05/04/18   Adult Transthoracic Echo Complete W/ Cont if Necessary Per Protocol    Narrative · Left ventricular systolic function is low normal. Estimated EF = 50%.  · Right ventricular cavity is mildly dilated.  · Trace mitral regurgitation, trace tricuspid regurgitation.          I have reviewed the medications.    Assessment/Plan   Assessment / Plan     Hospital Problem List     * (Principal)STEMI involving right coronary artery    Diabetes mellitus    Diet-controlled hyperlipidemia    GERD (gastroesophageal reflux disease) (Chronic)          57 yo  male admitted in transfer from OSH with inferior STEMI and underwent emergent cath on admit.  Went to ICU.  Stablized and transitioned to tele.  Hospitalist service was consulted on transfer to floor for general medical mgmt/dm mgmt.         Plan:  Inferior STEMI  --s/p emergent cath  --per cards  --on Asa, statin, Bystolic, and brinlinta     DM type II (A1c 6.9)  --glucoses running 120-168  --continue current regimen of LDSSI achs  --monitor  --likely to dc home today per cards.  Pt states he monitors his glucoses daily and prn.  Keeps log.  Generally runs .  States he will discuss any further tx/meds with PCP.    --Will f/u with pcp next week     HLD  --high potency  statin     GERD  --stable on no home meds  --pt prefers tums prn   --add PPI or H2 if indicated     Obesity    DVT Prophylaxis:    Heparin sq    CODE STATUS: Full Code    Disposition: I expect the patient to be discharged TBD per primary team      Electronically signed by Evonne Saul, EVARISTO, 05/07/18, 9:16 AM.

## 2018-05-07 NOTE — DISCHARGE SUMMARY
"Date of Discharge:  5/7/2018  Date of Admit: 5/4/2018    Discharge Diagnosis:Principal Problem:    STEMI involving right coronary artery  Active Problems:    Diabetes mellitus    Diet-controlled hyperlipidemia    GERD (gastroesophageal reflux disease)      Hospital Course:   Patient is a 58-year-old male who was admitted on 5/4/18 for an acute inferior STEMI.  He underwent emergent cardiac catheterizations with results as below.  He was then transferred into the intensive care unit.  He was monitored there and then deemed stable for telemetry floor.  He remained in stable condition throughout his stay.  No arrhythmias noted.  EF was noted to be 50%.  On 5/7/18 it was deemed that he had reached maximum benefit from his stay in the hospital and was ready for discharge home. Patient will be discharged home with a copy of his medical records so that he can establish care with a cardiologist in [place] where he resides.    Procedures Performed  Procedure(s):  Left Heart Cath    IMPRESSION:  1.  Acute inferior STEMI and occluded RCA treated with 3.5 x 38 mm Xience EES.  2.  30% eccentric ostial LAD stenosis  4.  LVEF 45-50 percent with inferior severe hypokinesis      ECHO:  · Left ventricular systolic function is low normal. Estimated EF = 50%.  · Right ventricular cavity is mildly dilated.  · Trace mitral regurgitation, trace tricuspid regurgitation.        Pertinent Test Results:     A1C 6.9  Peak troponin 124.844    Discharge Physical Exam:  /79   Pulse 76   Temp 97.8 °F (36.6 °C) (Oral)   Resp 16   Ht 177.8 cm (70\")   Wt 87.5 kg (192 lb 12.8 oz)   SpO2 96%   BMI 27.66 kg/m²     Physical Exam   Constitutional: He is oriented to person, place, and time. He appears well-developed and well-nourished. No distress.   Neck: No JVD present. No tracheal deviation present.   Cardiovascular: Normal rate, regular rhythm, normal heart sounds and intact distal pulses.  Exam reveals no friction rub.    No murmur " heard.  Pulmonary/Chest: Effort normal and breath sounds normal. No respiratory distress.   Abdominal: Soft. Bowel sounds are normal. There is no tenderness.   Musculoskeletal: He exhibits no edema or deformity.   Neurological: He is alert and oriented to person, place, and time.         Discharge Medications   Nicholas Ventura   Home Medication Instructions ESTHER:[id number]    Printed on:05/07/18 1112   Medication Information                      aspirin 81 MG chewable tablet  Chew 81 mg Daily.             atorvastatin (LIPITOR) 80 MG tablet  Take 1 tablet by mouth Every Night.             coenzyme Q10 100 MG capsule  Take 100 mg by mouth Daily.             glucosamine sulfate 500 MG capsule capsule  Take 1,200 mg by mouth 2 (Two) Times a Day With Meals.             Multiple Vitamins-Minerals (MULTI COMPLETE PO)  Take 1 tablet by mouth Daily.             nebivolol (BYSTOLIC) 5 MG tablet  Take 1 tablet by mouth Daily.             omeprazole (priLOSEC) 20 MG capsule  Take 20 mg by mouth Daily.             ticagrelor (BRILINTA) 90 MG tablet tablet  Take 1 tablet by mouth 2 (Two) Times a Day.                 Discharge Diet:    Cardiac/NCS    Activity at Discharge:    Light activity    Discharge disposition: home    Condition on Discharge: stable    Follow-up Appointments  Follow-up with Cardiologist in [place] where he resides.     Louis Olivia MD  05/07/18  11:12 AM      EMR Dragon/Transcription disclaimer:  Much of this encounter note is an electronic transcription/translation of spoken language to printed text. Electronic translation of spoken language may permit erroneous, or at times, nonsensical words or phrases to be inadvertently transcribed. Although I have reviewed the note for such errors, some may still exist.

## 2018-05-07 NOTE — PLAN OF CARE
Problem: Patient Care Overview  Goal: Plan of Care Review  Outcome: Ongoing (interventions implemented as appropriate)   05/07/18 0577   Coping/Psychosocial   Plan of Care Reviewed With patient   Plan of Care Review   Progress improving   OTHER   Outcome Summary no chest pain sius rhythm refusing insulin room air hoping to go home pt would like results of echo

## 2018-05-07 NOTE — PLAN OF CARE
Problem: Cardiac: ACS (Acute Coronary Syndrome) (Adult)  Intervention: Optimize Myocardial Oxygenation/Perfusion   05/07/18 0575   Activity   Activity Management activity adjusted per tolerance       Goal: Signs and Symptoms of Listed Potential Problems Will be Absent, Minimized or Managed (Cardiac: ACS)  Outcome: Ongoing (interventions implemented as appropriate)

## 2018-05-07 NOTE — PLAN OF CARE
Problem: Patient Care Overview  Goal: Plan of Care Review  Outcome: Ongoing (interventions implemented as appropriate)   05/07/18 1008   Coping/Psychosocial   Plan of Care Reviewed With patient   Plan of Care Review   Progress no change   OTHER   Outcome Summary no c/o of chest pain. VSS. Groin site C/D/I.

## 2018-05-10 RX ORDER — ATORVASTATIN CALCIUM 80 MG/1
80 TABLET, FILM COATED ORAL NIGHTLY
Qty: 90 TABLET | Refills: 3 | Status: SHIPPED | OUTPATIENT
Start: 2018-05-10 | End: 2018-05-24 | Stop reason: SDUPTHER

## 2018-05-10 NOTE — PAYOR COMM NOTE
"Nicholas Peralta (58 y.o. Male)   Id # [id number]  Jovana Cardoza RN, BSN  Phone # [phone]  Fax # [phone]    Date of Birth Social Security Number Address Home Phone MRN    1960  [address] [place] [address] [phone] [id number]    Tenriism Marital Status          Unknown Unknown       Admission Date Admission Type Admitting Provider Attending Provider Department, Room/Bed    5/4/18 Urgent Louis Olivia MD  [facility] 6A, N603/1    Discharge Date Discharge Disposition Discharge Destination        5/7/2018 Home or Self Care              Attending Provider:  (none)   Allergies:  No Known Allergies    Isolation:  None   Infection:  None   Code Status:  Prior    Ht:  177.8 cm (70\")   Wt:  87.5 kg (192 lb 12.8 oz)    Admission Cmt:  None   Principal Problem:  STEMI involving right coronary artery [I21.11]                 Active Insurance as of 5/4/2018     Primary Coverage     Payor Plan Insurance Group Employer/Plan Group    [employer]      Payor Plan Address Payor Plan Phone Number Effective From Effective To    [address] [phone] 1/1/2018     [place] [address]       Subscriber Name Subscriber Birth Date Member ID       NICHOLAS PERALTA 1960 [id number]                 Emergency Contacts      (Rel.) Home Phone Work Phone Mobile Phone    Cindy Peralta (Spouse) -- -- [phone]           Discharge Summary      Louis Olivia MD at 5/7/2018  8:25 AM          Date of Discharge:  5/7/2018  Date of Admit: 5/4/2018    Discharge Diagnosis:Principal Problem:    STEMI involving right coronary artery  Active Problems:    Diabetes mellitus    Diet-controlled hyperlipidemia    GERD (gastroesophageal reflux disease)      Hospital Course:   Patient is a 58-year-old male who was admitted on 5/4/18 for an acute inferior STEMI.  He underwent emergent cardiac catheterizations with results as below.  He was then transferred " "into the intensive care unit.  He was monitored there and then deemed stable for telemetry floor.  He remained in stable condition throughout his stay.  No arrhythmias noted.  EF was noted to be 50%.  On 5/7/18 it was deemed that he had reached maximum benefit from his stay in the hospital and was ready for discharge home. Patient will be discharged home with a copy of his medical records so that he can establish care with a cardiologist in [place] where he resides.    Procedures Performed  Procedure(s):  Left Heart Cath    IMPRESSION:  1.  Acute inferior STEMI and occluded RCA treated with 3.5 x 38 mm Xience EES.  2.  30% eccentric ostial LAD stenosis  4.  LVEF 45-50 percent with inferior severe hypokinesis      ECHO:  · Left ventricular systolic function is low normal. Estimated EF = 50%.  · Right ventricular cavity is mildly dilated.  · Trace mitral regurgitation, trace tricuspid regurgitation.        Pertinent Test Results:     A1C 6.9  Peak troponin 124.844    Discharge Physical Exam:  /79   Pulse 76   Temp 97.8 °F (36.6 °C) (Oral)   Resp 16   Ht 177.8 cm (70\")   Wt 87.5 kg (192 lb 12.8 oz)   SpO2 96%   BMI 27.66 kg/m²      Physical Exam   Constitutional: He is oriented to person, place, and time. He appears well-developed and well-nourished. No distress.   Neck: No JVD present. No tracheal deviation present.   Cardiovascular: Normal rate, regular rhythm, normal heart sounds and intact distal pulses.  Exam reveals no friction rub.    No murmur heard.  Pulmonary/Chest: Effort normal and breath sounds normal. No respiratory distress.   Abdominal: Soft. Bowel sounds are normal. There is no tenderness.   Musculoskeletal: He exhibits no edema or deformity.   Neurological: He is alert and oriented to person, place, and time.         Discharge Medications   Nicholas Ventura   [facility] Medication Instructions ESTHER:[id number]    Printed on:05/07/18 1112   Medication Information                    "   aspirin 81 MG chewable tablet  Chew 81 mg Daily.             atorvastatin (LIPITOR) 80 MG tablet  Take 1 tablet by mouth Every Night.             coenzyme Q10 100 MG capsule  Take 100 mg by mouth Daily.             glucosamine sulfate 500 MG capsule capsule  Take 1,200 mg by mouth 2 (Two) Times a Day With Meals.             Multiple Vitamins-Minerals (MULTI COMPLETE PO)  Take 1 tablet by mouth Daily.             nebivolol (BYSTOLIC) 5 MG tablet  Take 1 tablet by mouth Daily.             omeprazole (priLOSEC) 20 MG capsule  Take 20 mg by mouth Daily.             ticagrelor (BRILINTA) 90 MG tablet tablet  Take 1 tablet by mouth 2 (Two) Times a Day.                 Discharge Diet:    Cardiac/NCS    Activity at Discharge:    Light activity    Discharge disposition: home    Condition on Discharge: stable    Follow-up Appointments  Follow-up with Cardiologist in [place] where he resides.     Louis Juárez MD  05/07/18  11:12 AM      EMR Dragon/Transcription disclaimer:  Much of this encounter note is an electronic transcription/translation of spoken language to printed text. Electronic translation of spoken language may permit erroneous, or at times, nonsensical words or phrases to be inadvertently transcribed. Although I have reviewed the note for such errors, some may still exist.      Electronically signed by Louis Juárez MD at 5/7/2018 11:12 AM       Discharge Order     Start     Ordered    05/07/18 1112  Discharge patient  Once     Expected Discharge Date:  05/07/18    Discharge Disposition:  Home or Self Care    Physician of Record:  LOUIS JUÁREZ [[id number]]    Physician of Record selection review needed?:  No       Question Answer Comment   Physician of Record LOUIS JUÁREZ    Physician of Record selection review needed? No        05/07/18 1112        EVARISTO Curtis Nurse Practitioner Signed Medicine  Consults Date of Service: 5/6/2018  9:00 AM      Expand All Collapse All     []Manual[]Template  []Copied        [facility] Medicine Services  CONSULT NOTE        Patient Name: Nicholas Ventura  : 1960  MRN: [id number]     Primary Care Physician: No Known Provider  Referring Provider: Louis Olivia MD        Subjective      Subjective      Reason for Consultation:  Medical management/DM     HPI:  Nicholas Ventura is a 58 y.o. male with hx of GERD, DM II, and HLD.  Pt was admitted to cardiology service in transfer from outside hospital secondary to inferior STEMI.  No history of prior CHF.  Underwent emergent catheter and stenting.  ICU post procedure.  Stabilizing transitioned to the floor.  Cardiology managing.  Hospitalist is been consulted for general medical management on transfer to the floor.        Review of Systems   Constitutional: Positive for fatigue. Negative for appetite change, diaphoresis and fever.   HENT: Negative.    Eyes: Negative.    Respiratory: Negative for cough, choking, shortness of breath and wheezing.    Cardiovascular: Negative for chest pain and leg swelling.   Gastrointestinal: Negative.    Genitourinary: Negative.    Musculoskeletal: Negative.    Neurological: Negative.    Psychiatric/Behavioral: Negative.          Otherwise 10-system ROS reviewed and is negative except as mentioned in the HPI.        Medical History        Past Medical History:   Diagnosis Date   • Diabetes mellitus     • GERD (gastroesophageal reflux disease)     • Hyperlipidemia              Surgical History   History reviewed. No pertinent surgical history.        Family History: family history includes Cancer in his father and mother; No Known Problems in his daughter, daughter, sister, sister, and son; Obesity in his brother; Transient ischemic attack in his father.      Social History:  reports that he has never smoked. He has never used smokeless tobacco. He reports that he does not drink alcohol or use drugs.     Medications:  Prescriptions Prior to  Admission           Prescriptions Prior to Admission   Medication Sig Dispense Refill Last Dose   • aspirin 81 MG chewable tablet Chew 81 mg Daily.         • coenzyme Q10 100 MG capsule Take 100 mg by mouth Daily.         • glucosamine sulfate 500 MG capsule capsule Take 1,200 mg by mouth 2 (Two) Times a Day With Meals.         • Multiple Vitamins-Minerals (MULTI COMPLETE PO) Take 1 tablet by mouth Daily.         • omeprazole (priLOSEC) 20 MG capsule Take 20 mg by mouth Daily.         • Red Yeast Rice 600 MG capsule Take 1,200 mg by mouth 2 (Two) Times a Day.         • tadalafil (CIALIS) 5 MG tablet Take 5 mg by mouth Daily As Needed for erectile dysfunction.                  No Known Allergies        Objective      Objective      Vital Signs:   Temp:  [98.1 °F (36.7 °C)-98.9 °F (37.2 °C)] 98.1 °F (36.7 °C)  Heart Rate:  [64-76] 76  Resp:  [14-18] 18  BP: (104-131)/(62-78) 117/72      Physical Exam  Constitutional: No acute distress, awake, alert.  Sitting up in bed with wife at bs  Eyes: PERRLA, sclerae anicteric, no conjunctival injection  HENT: NCAT, mucous membranes moist.   Neck: Supple, no thyromegaly, no lymphadenopathy, trachea midline  Respiratory: Clear to auscultation bilaterally A/P, nonlabored respirations on RA.  Cardiovascular: RRR, no murmurs, rubs, or gallops, palpable pedal pulses bilaterally  Gastrointestinal: Positive bowel sounds, soft, nontender, nondistended.  Obese  Musculoskeletal: No bilateral ankle edema, no clubbing or cyanosis to extremities.  CULLEN spont  Psychiatric: Appropriate affect, cooperative and calm   Neurologic: Oriented x 3, strength symmetric in all extremities, Cranial Nerves grossly intact to confrontation, speech clear.  Follows commands   Skin: No rashes        Results Reviewed:  I have personally reviewed current lab, radiology, and data and agree.        Results from last 7 days  Lab Units 05/05/18  0422   WBC 10*3/mm3 9.93   HEMOGLOBIN g/dL 14.5   HEMATOCRIT % 42.7    PLATELETS 10*3/mm3 222        Results from last 7 days  Lab Units 05/05/18  0422 05/04/18  1706   SODIUM mmol/L 138 137   POTASSIUM mmol/L 3.9 3.9   CHLORIDE mmol/L 105 107   CO2 mmol/L 29.0 24.0   BUN mg/dL 11 11   CREATININE mg/dL 0.90 0.80   GLUCOSE mg/dL 173* 120*   CALCIUM mg/dL 9.2 8.5*   ALT (SGPT) U/L  --  43*   AST (SGOT) U/L  --  94*   TROPONIN I ng/mL 124.844* 30.910*     Estimated Creatinine Clearance: 100.9 mL/min (by C-G formula based on SCr of 0.9 mg/dL).      Brief Urine Lab Results      None          No results found for: BNP  No results found for: PHART         Microbiology Results Abnormal      None                 Imaging Results (last 24 hours)      ** No results found for the last 24 hours. **               Results for orders placed during the hospital encounter of 05/04/18   Adult Transthoracic Echo Complete W/ Cont if Necessary Per Protocol     Narrative · Left ventricular systolic function is low normal. Estimated EF = 50%.  · Right ventricular cavity is mildly dilated.  · Trace mitral regurgitation, trace tricuspid regurgitation.               Assessment/Plan      Assessment / Plan          Hospital Problem List      * (Principal)STEMI involving right coronary artery     Diabetes mellitus     Diet-controlled hyperlipidemia     GERD (gastroesophageal reflux disease) (Chronic)          57 yo  male admitted in transfer from OSH with inferior STEMI and underwent emergent cath on admit.  Went to ICU.  Stablized and transitioned to tele.  Hospitalist service was consulted on transfer to floor for general medical mgmt/dm mgmt.         Plan:  Inferior STEMI  --s/p emergent cath  --per cards  --on Asa, statin, Bystolic, and brinlinta     DM type II (A1c 6.9)  --glucoses running 130-168  --continue current regimen of LDSSI achs  --monitor     HLD  --high potency statin     GERD  --stable on no home meds  --pt prefers tums prn   --add PPI or H2 if indicated     Obesity     Disposition: per  "primary team TBD      Thank you for allowing [facility] Medicine Service to provide consultative care for your patient, we will continue to follow while clinically appropriate.     Electronically signed by EVARISTO Curtis, 05/06/18, 9:27 AM.                     Routing History                  Elliott Rolle MD Physician Signed Cardiology  Progress Notes Date of Service: 5/6/2018  8:06 AM      Expand All Collapse All    []Manual[]Template  []Copied  [facility]  IP Progress Note   LOS: 2 days   Patient Care Team:  No Known Provider as PCP - General     Chief Complaint: Follow up for Coronary Artery Disease/acute MI        Subjective       Feeling very well, has been walking around without problems.  Denies chest pain or shortness of breath.  Concerned about diabetes management.       Tele: Sinus Rythym     Vitals:  Blood pressure 104/69, pulse 64, temperature 98.1 °F (36.7 °C), temperature source Oral, resp. rate 18, height 177.8 cm (70\"), weight 92.3 kg (203 lb 6.4 oz), SpO2 95 %.      Intake/Output Summary (Last 24 hours) at 05/06/18 0806  Last data filed at 05/05/18 1400    Gross per 24 hour   Intake              240 ml   Output              400 ml   Net             -160 ml         Physical Exam:           General: No apparent distress.  Neck: no JVD.  Chest:No respiratory distress, breath sounds are normal. No wheezes,  rhonchi or rales.  Cardiovascular: Normal S1 and S2, no murmer, gallop or rub.    Extremities: No edema.     Results Review:                I reviewed the patient's new clinical results.        Results from last 7 days  Lab Units 05/05/18  0422   WBC 10*3/mm3 9.93   HEMOGLOBIN g/dL 14.5   HEMATOCRIT % 42.7   PLATELETS 10*3/mm3 222         Results from last 7 days  Lab Units 05/05/18  0422 05/04/18  1706   SODIUM mmol/L 138 137   POTASSIUM mmol/L 3.9 3.9   CHLORIDE mmol/L 105 107   CO2 mmol/L 29.0 24.0   BUN mg/dL 11 11   CREATININE mg/dL " 0.90 0.80   CALCIUM mg/dL 9.2 8.5*   BILIRUBIN mg/dL  --  0.5   ALK PHOS U/L  --  50   ALT (SGPT) U/L  --  43*   AST (SGOT) U/L  --  94*   GLUCOSE mg/dL 173* 120*             Scheduled Meds:  aspirin 81 mg Oral Daily   atorvastatin 80 mg Oral Nightly   docusate sodium 100 mg Oral Daily   heparin (porcine) 5,000 Units Subcutaneous Q12H   insulin lispro 0-7 Units Subcutaneous 4x Daily With Meals & Nightly   nebivolol 5 mg Oral Q24H   pharmacy consult - MTM   Does not apply Daily   ticagrelor 90 mg Oral BID         Assessment:   1. Acute Inferior STEMI  2. CAD, total occlusion of RCA, 30% plaque of the LAD, EF 50% with inferior hypokinesis.  3. 3.5x38 DINA to mid RCA.    4. Type 2 diabetes.    5. Dyslipidemia.   Plan:  1. Stable post MI/post procedure course.  2. We will check A1c level.  3. Continue current medications.  4. He lives in [place], will discharge tomorrow and he will follow-up with local cardiologist.       GILLIAN Balbuena  05/06/18  8:06 AM        I have seen and examined the patient, case was discussed with the physician extender, reviewed the above note, necessary changes were made and I agree with the final note.   Elliott Rolle MD, [facility], [facility]                                             Revision History                              Aldo Escobar MD Physician Signed Pulmonology  Progress Notes Date of Service: 5/5/2018  2:46 PM         []Manual[]Template  []Copied  Intensive Care Follow-up       LOS: 1 day      Mr. Nicholas Ventura, 58 y.o. male is followed for: STEMI involving right coronary artery      Subjective - Interval History      Patient is awake and alert and has no further chest pain.  He did complain of some right groin pain earlier.     The patient's relevant past medical, surgical and social history were reviewed and updated in Epic as appropriate.      Objective      Infusions:  Medications:     aspirin 81 mg Oral Daily   atorvastatin 80 mg Oral Nightly   docusate sodium 100 mg  Oral Daily   heparin (porcine) 5,000 Units Subcutaneous Q12H   insulin lispro 0-7 Units Subcutaneous 4x Daily With Meals & Nightly   nebivolol 5 mg Oral Q24H   pharmacy consult - MTM   Does not apply Daily   ticagrelor 90 mg Oral BID         Vital Sign Min/Max for last 24 hours  Temp  Min: 98 °F (36.7 °C)  Max: 98.6 °F (37 °C)   BP  Min: 101/88  Max: 153/82   Pulse  Min: 64  Max: 99   Resp  Min: 14  Max: 18   SpO2  Min: 94 %  Max: 98 %   No Data Recorded       Intake/Output Summary (Last 24 hours) at 05/05/18 1446  Last data filed at 05/05/18 1400    Gross per 24 hour   Intake              720 ml   Output             2855 ml   Net            -2135 ml              Physical Exam:              GENERAL: Awake and alert              HEENT: Normocephalic              LUNGS: Clear              HEART: Normal S1 and S2              ABDOMEN: Soft              EXTREMITIES: No edema              NEURO/PSYCH: No focal deficits        Results from last 7 days  Lab Units 05/05/18  0422 05/04/18  1706   WBC 10*3/mm3 9.93 8.54   HEMOGLOBIN g/dL 14.5 13.8   PLATELETS 10*3/mm3 222 201         Results from last 7 days  Lab Units 05/05/18  0422 05/04/18  1706   SODIUM mmol/L 138 137   POTASSIUM mmol/L 3.9 3.9   CO2 mmol/L 29.0 24.0   BUN mg/dL 11 11   CREATININE mg/dL 0.90 0.80   MAGNESIUM mg/dL  --  2.0   GLUCOSE mg/dL 173* 120*      Estimated Creatinine Clearance: 100.9 mL/min (by C-G formula based on SCr of 0.9 mg/dL).        Results from last 7 days  Lab Units 05/05/18  0422   HEMOGLOBIN A1C % 6.90*             No results found for: LACTATE      Images: No new imaging     I reviewed the patient's results and images.      Impression           Hospital Problem List      * (Principal)STEMI involving right coronary artery           Diabetes mellitus           Diet-controlled hyperlipidemia                    Plan         Patient seems be doing well following his MI.  He's preceding per protocol.  He is awaiting transfer to the floor.   "We'll continue supportive measures.     Plan of care and goals reviewed with nurse at daily rounds.   I discussed the patient's findings and my recommendations with patient, family and nursing staff         Aldo Escobar MD  Pulmonary and Critical Care Medicine  05/05/18 2:46 PM               Elliott Rolle MD Physician Signed Cardiology  Progress Notes Date of Service: 5/5/2018  8:01 AM         []Manual[]Template  []Copied  [facility] at [facility]  IP Progress Note        Chief Complaint: Follow-up of acute MI/CAD.     Subjective:  Feels a lot better than how he did yesterday.  Still has very mild lower sternal pressure.  Coronary bed to chair without problems.  No palpitations.  No nausea or vomiting.     Objective:  Blood pressure 110/74, pulse 68, temperature 98.5 °F (36.9 °C), temperature source Oral, resp. rate 18, height 177.8 cm (70\"), weight 92.3 kg (203 lb 6.4 oz), SpO2 95 %.      Intake/Output Summary (Last 24 hours) at 05/05/18 0801  Last data filed at 05/05/18 0700    Gross per 24 hour   Intake              240 ml   Output             2155 ml   Net            -1915 ml         Physical Exam:  General: No acute distress.   Neck: no JVD.  Chest:No respiratory distress, breath sounds are normal. No wheezes,  rhonchi or rales.  Cardiovascular: Normal S1 and S2, no murmer, gallop or rub.    Extremities: No edema.Rt groin intact, no bleeding or hematoma.     Results Review:                I reviewed the patient's new clinical results.        Results from last 7 days  Lab Units 05/05/18  0422   WBC 10*3/mm3 9.93   HEMOGLOBIN g/dL 14.5   HEMATOCRIT % 42.7   PLATELETS 10*3/mm3 222         Results from last 7 days  Lab Units 05/05/18  0422 05/04/18  1706   SODIUM mmol/L 138 137   POTASSIUM mmol/L 3.9 3.9   CHLORIDE mmol/L 105 107   CO2 mmol/L 29.0 24.0   BUN mg/dL 11 11   CREATININE mg/dL 0.90 0.80   CALCIUM mg/dL 9.2 8.5*   BILIRUBIN mg/dL  --  0.5   ALK PHOS U/L  --  50   ALT (SGPT) U/L  --  " 43*   AST (SGOT) U/L  --  94*   GLUCOSE mg/dL 173* 120*         Results from last 7 days  Lab Units 05/05/18  0422   SODIUM mmol/L 138   POTASSIUM mmol/L 3.9   CHLORIDE mmol/L 105   CO2 mmol/L 29.0   BUN mg/dL 11   CREATININE mg/dL 0.90   GLUCOSE mg/dL 173*   CALCIUM mg/dL 9.2                Lab Results   Component Value Date     TROPONINI 124.844 (C) 05/05/2018         Results from last 7 days  Lab Units 05/05/18  0422   TSH mIU/mL 2.353         Results from last 7 days  Lab Units 05/05/18  0422   CHOLESTEROL mg/dL 163   TRIGLYCERIDES mg/dL 154*   HDL CHOL mg/dL 38*   LDL CHOL mg/dL 115             Tele: Sinus Rythym        Assessment:   1. Acute Inferior STEMI  2. CAD, total occlusion of RCA, 30% plaque of the LAD, EF 50% with inferior hypokinesis.  3. 3.5x38 DINA to mid RCA.    4. Type 2 diabetes.    5. Dyslipidemia.   Plan:  1. Stable post MI/post procedure course.  2. Transfer to telemetry.  3. Increase activities, continue current medications, watch over the weekend, hopefully home Monday.     Elliott Rolle MD, FACC, [facility]

## 2018-05-24 RX ORDER — ATORVASTATIN CALCIUM 80 MG/1
80 TABLET, FILM COATED ORAL NIGHTLY
Qty: 90 TABLET | Refills: 3 | Status: SHIPPED | OUTPATIENT
Start: 2018-05-24

## (undated) DEVICE — MODEL BT2000 P/N 700287-012KIT CONTENTS: MANIFOLD WITH SALINE AND CONTRAST PORTS, SALINE TUBING WITH SPIKE AND HAND SYRINGE, TRANSDUCER: Brand: BT2000 AUTOMATED MANIFOLD KIT

## (undated) DEVICE — ST EXT IV SMARTSITE 2VLV SP M LL 5ML IV1

## (undated) DEVICE — ANGIO-SEAL VIP VASCULAR CLOSURE DEVICE: Brand: ANGIO-SEAL

## (undated) DEVICE — PINNACLE INTRODUCER SHEATH: Brand: PINNACLE

## (undated) DEVICE — CATH DIAG EXPO M/ PK 6FR FL4/FR4 PIG 3PK

## (undated) DEVICE — Device: Brand: ASAHI SION BLUE

## (undated) DEVICE — DRSNG PRESS SAFEGUARD

## (undated) DEVICE — ST INF PRI SMRTSTE 20DRP 2VLV 24ML 117

## (undated) DEVICE — HI-TORQUE WHISPER MS GUIDE WIRE .014 STRAIGHT TIP 3.0 CM X 190 CM: Brand: HI-TORQUE WHISPER

## (undated) DEVICE — TREK CORONARY DILATATION CATHETER 2.50 MM X 20 MM / RAPID-EXCHANGE: Brand: TREK

## (undated) DEVICE — MODEL AT P65, P/N 701554-001KIT CONTENTS: HAND CONTROLLER, 3-WAY HIGH-PRESSURE STOPCOCK WITH ROTATING END AND PREMIUM HIGH-PRESSURE TUBING: Brand: ANGIOTOUCH® KIT

## (undated) DEVICE — GW J TP FIX CORE .035 150

## (undated) DEVICE — GUIDE CATHETER: Brand: MACH1™